# Patient Record
Sex: FEMALE | Race: WHITE | NOT HISPANIC OR LATINO | Employment: OTHER | URBAN - METROPOLITAN AREA
[De-identification: names, ages, dates, MRNs, and addresses within clinical notes are randomized per-mention and may not be internally consistent; named-entity substitution may affect disease eponyms.]

---

## 2022-11-28 ENCOUNTER — TRANSFERRED RECORDS (OUTPATIENT)
Dept: HEALTH INFORMATION MANAGEMENT | Facility: CLINIC | Age: 72
End: 2022-11-28

## 2023-08-13 ENCOUNTER — HEALTH MAINTENANCE LETTER (OUTPATIENT)
Age: 73
End: 2023-08-13

## 2023-09-12 ASSESSMENT — ENCOUNTER SYMPTOMS
ARTHRALGIAS: 0
FREQUENCY: 0
COUGH: 0
FEVER: 0
WEAKNESS: 0
DIZZINESS: 0
HEMATOCHEZIA: 0
JOINT SWELLING: 0
ABDOMINAL PAIN: 0
HEMATURIA: 0
CHILLS: 0
DIARRHEA: 0
SORE THROAT: 0
NERVOUS/ANXIOUS: 0
MYALGIAS: 0
BREAST MASS: 0
HEARTBURN: 0
CONSTIPATION: 0
HEADACHES: 0
SHORTNESS OF BREATH: 0
EYE PAIN: 0
DYSURIA: 0
NAUSEA: 0
PALPITATIONS: 0
PARESTHESIAS: 0

## 2023-09-12 ASSESSMENT — ACTIVITIES OF DAILY LIVING (ADL): CURRENT_FUNCTION: NO ASSISTANCE NEEDED

## 2023-09-19 ENCOUNTER — OFFICE VISIT (OUTPATIENT)
Dept: FAMILY MEDICINE | Facility: CLINIC | Age: 73
End: 2023-09-19
Payer: COMMERCIAL

## 2023-09-19 ENCOUNTER — PATIENT OUTREACH (OUTPATIENT)
Dept: ONCOLOGY | Facility: CLINIC | Age: 73
End: 2023-09-19

## 2023-09-19 VITALS
BODY MASS INDEX: 21.83 KG/M2 | RESPIRATION RATE: 13 BRPM | WEIGHT: 115.6 LBS | DIASTOLIC BLOOD PRESSURE: 73 MMHG | HEART RATE: 60 BPM | HEIGHT: 61 IN | OXYGEN SATURATION: 98 % | TEMPERATURE: 98.2 F | SYSTOLIC BLOOD PRESSURE: 120 MMHG

## 2023-09-19 DIAGNOSIS — E78.5 DYSLIPIDEMIA: ICD-10-CM

## 2023-09-19 DIAGNOSIS — M81.0 AGE RELATED OSTEOPOROSIS, UNSPECIFIED PATHOLOGICAL FRACTURE PRESENCE: ICD-10-CM

## 2023-09-19 DIAGNOSIS — Z86.59 HISTORY OF POSTTRAUMATIC STRESS DISORDER (PTSD): ICD-10-CM

## 2023-09-19 DIAGNOSIS — Z79.811 LONG TERM CURRENT USE OF AROMATASE INHIBITOR: ICD-10-CM

## 2023-09-19 DIAGNOSIS — Z01.00 ENCOUNTER FOR EXAMINATION OF VISION: ICD-10-CM

## 2023-09-19 DIAGNOSIS — C50.911 MALIGNANT NEOPLASM OF RIGHT FEMALE BREAST, UNSPECIFIED ESTROGEN RECEPTOR STATUS, UNSPECIFIED SITE OF BREAST (H): ICD-10-CM

## 2023-09-19 DIAGNOSIS — Z23 NEED FOR VACCINATION: ICD-10-CM

## 2023-09-19 DIAGNOSIS — M85.80 AGE-RELATED BONE LOSS: ICD-10-CM

## 2023-09-19 DIAGNOSIS — Z12.31 VISIT FOR SCREENING MAMMOGRAM: ICD-10-CM

## 2023-09-19 DIAGNOSIS — Z00.00 ENCOUNTER FOR MEDICARE ANNUAL WELLNESS EXAM: Primary | ICD-10-CM

## 2023-09-19 DIAGNOSIS — Z13.220 ENCOUNTER FOR SCREENING FOR LIPID DISORDER: ICD-10-CM

## 2023-09-19 PROCEDURE — G0439 PPPS, SUBSEQ VISIT: HCPCS | Performed by: INTERNAL MEDICINE

## 2023-09-19 PROCEDURE — 90662 IIV NO PRSV INCREASED AG IM: CPT | Performed by: INTERNAL MEDICINE

## 2023-09-19 PROCEDURE — G0008 ADMIN INFLUENZA VIRUS VAC: HCPCS | Performed by: INTERNAL MEDICINE

## 2023-09-19 RX ORDER — COVID-19 MOLECULAR TEST ASSAY
KIT MISCELLANEOUS
COMMUNITY
Start: 2022-10-19 | End: 2024-03-19

## 2023-09-19 RX ORDER — ALENDRONATE SODIUM 70 MG/1
70 TABLET ORAL
COMMUNITY
End: 2024-03-19

## 2023-09-19 RX ORDER — ANASTROZOLE 1 MG/1
1 TABLET ORAL DAILY
COMMUNITY
End: 2024-03-19

## 2023-09-19 ASSESSMENT — ENCOUNTER SYMPTOMS
SHORTNESS OF BREATH: 0
JOINT SWELLING: 0
MYALGIAS: 0
HEMATOCHEZIA: 0
SORE THROAT: 0
DYSURIA: 0
WEAKNESS: 0
PALPITATIONS: 0
COUGH: 0
PARESTHESIAS: 0
BREAST MASS: 0
HEARTBURN: 0
NAUSEA: 0
DIZZINESS: 0
CHILLS: 0
ARTHRALGIAS: 0
FREQUENCY: 0
HEMATURIA: 0
DIARRHEA: 0
CONSTIPATION: 0
FEVER: 0
ABDOMINAL PAIN: 0
EYE PAIN: 0
HEADACHES: 0
NERVOUS/ANXIOUS: 0

## 2023-09-19 ASSESSMENT — ACTIVITIES OF DAILY LIVING (ADL): CURRENT_FUNCTION: NO ASSISTANCE NEEDED

## 2023-09-19 ASSESSMENT — PAIN SCALES - GENERAL: PAINLEVEL: NO PAIN (0)

## 2023-09-19 NOTE — PROGRESS NOTES
New Patient Oncology Nurse Navigator Note     Referring provider: Andressa Khanna MD     [facility]IM[facility]PEDS     Referred to (specialty:) Medical Oncology     Requested provider (if applicable): NA     Date Referral Received: September 19, 2023     Evaluation for:  Breast cancer  C50.911 (ICD-10-CM) - Malignant neoplasm of right female breast, unspecified estrogen receptor status, unspecified site of breast (H)      Clinical History (per Nurse review of records provided):      Per patient she was previously receiving care at [facility] in [place] with Dr. Agnes Dash. She had a lumpectomy December of 2020 followed by 6 weeks of radiation starting inf January 2021. She started endocrine therapy at the completion of radiation with anastrozole. He last mammogram was October of last year. She was seeing her Oncologist every 6 months. She has plenty of anastrozole and will not need a refill for a couple of months.     Lumpectomy 2020  Radiation Tx January 2021 for about 6 wks.   Started Endocrine Therapy with   Last Mammogram was Oct. Of 2022  Was seeing Oncologist every 6 months.    Records Location: NA     Records Needed: NEED records from [place]. Chart flagged as records needed.      Additional testing needed prior to consult: NA    Payor: [facility] / Plan: [facility] MEDICARE ADVANTAGE / Product Type: Medicare /     September 19, 2023  Called patient this afternoon to introduce self and the role of the nurse navigator. Explained to patient that we have received the referral for her to get established with medical oncology but are awaiting her medical records. Provided patient with contact information and informed her that one records have been received and reviewed she would be called to set up her consultation with medical oncology. Patient verbalized understanding of plan and was grateful for the coordination of care.     Amarilis CONNORSN, RN   Oncology  Nurse Navigator   [facility]   [phone] / [phone]

## 2023-09-19 NOTE — PROGRESS NOTES
"SUBJECTIVE:   Dakota is a 73 year old who presents for Preventive Visit.      9/19/2023     9:50 AM   Additional Questions   Roomed by manuelito       Are you in the first 12 months of your Medicare coverage?  No    Healthy Habits:     In general, how would you rate your overall health?  Excellent    Frequency of exercise:  4-5 days/week    Duration of exercise:  45-60 minutes    Do you usually eat at least 4 servings of fruit and vegetables a day, include whole grains    & fiber and avoid regularly eating high fat or \"junk\" foods?  Yes    Taking medications regularly:  Yes    Ability to successfully perform activities of daily living:  No assistance needed    Home Safety:  Lack of grab bars in the bathroom    Hearing Impairment:  No hearing concerns    In the past 6 months, have you been bothered by leaking of urine?  No    In general, how would you rate your overall mental or emotional health?  Excellent    Additional concerns today:  No        Have you ever done Advance Care Planning? (For example, a Health Directive, POLST, or a discussion with a medical provider or your loved ones about your wishes): Yes, patient states has an Advance Care Planning document and will bring a copy to the clinic.    Hearing Acuity: Able to converse without any difficulty    Fall risk  Fallen 2 or more times in the past year?: No  Any fall with injury in the past year?: No  click delete button to remove this line now  Cognitive Screening   1) Repeat 3 items (Leader, Season, Table)    2) Clock draw: {NORMAL  3) 3 item recall: Recalls 3 objects  Results: 3 items recalled: COGNITIVE IMPAIRMENT LESS LIKELY    Mini-CogTM Copyright FARIDEH Mitchell. Licensed by the author for use in [facility]; reprinted with permission (lamine@.[facility]). All rights reserved.      Do you have sleep apnea, excessive snoring or daytime drowsiness? : no    Reviewed and updated as needed this visit by clinical staff   Tobacco  Allergies  Meds  Problems  Med " Hx  Surg Hx  Fam Hx          Reviewed and updated as needed this visit by Provider   Tobacco  Allergies  Meds  Problems  Med Hx  Surg Hx  Fam Hx         Social History     Tobacco Use    Smoking status: Never    Smokeless tobacco: Never   Substance Use Topics    Alcohol use: Not on file             9/12/2023    10:39 AM   Alcohol Use   Prescreen: >3 drinks/day or >7 drinks/week? No          No data to display              Do you have a current opioid prescription? No  Do you use any other controlled substances or medications that are not prescribed by a provider? None    Current providers sharing in care for this patient include:   Patient Care Team:  Clinic, [facility] as PCP - General    The following health maintenance items are reviewed in Epic and correct as of today:  Health Maintenance   Topic Date Due    DEXA  Never done    MAMMO SCREENING  Never done    COVID-19 Vaccine (1) Never done    COLORECTAL CANCER SCREENING  Never done    HEPATITIS C SCREENING  Never done    DTAP/TDAP/TD IMMUNIZATION (1 - Tdap) Never done    LIPID  Never done    ZOSTER IMMUNIZATION (1 of 2) Never done    MEDICARE ANNUAL WELLNESS VISIT  Never done    Pneumococcal Vaccine: 65+ Years (1 - PCV) Never done    INFLUENZA VACCINE (1) Never done    ANNUAL REVIEW OF HM ORDERS  09/19/2024    FALL RISK ASSESSMENT  09/19/2024    ADVANCE CARE PLANNING  09/19/2028    PHQ-2 (once per calendar year)  Completed    IPV IMMUNIZATION  Aged Out    HPV IMMUNIZATION  Aged Out    MENINGITIS IMMUNIZATION  Aged Out     Lab work is in process  Labs reviewed in [facility]  Mammogram Screening: Mammogram Screening: Recommended mammography every 1-2 years with patient discussion and risk factor consideration - Annual given Breast cancer history      Review of Systems   Constitutional:  Negative for chills and fever.   HENT:  Negative for congestion, ear pain, hearing loss and sore throat.    Eyes:  Negative for pain and visual disturbance.  "  Respiratory:  Negative for cough and shortness of breath.    Cardiovascular:  Negative for chest pain, palpitations and peripheral edema.   Gastrointestinal:  Negative for abdominal pain, constipation, diarrhea, heartburn, hematochezia and nausea.   Breasts:  Negative for tenderness, breast mass and discharge.   Genitourinary:  Negative for dysuria, frequency, genital sores, hematuria, pelvic pain, urgency, vaginal bleeding and vaginal discharge.   Musculoskeletal:  Negative for arthralgias, joint swelling and myalgias.   Skin:  Negative for rash.   Neurological:  Negative for dizziness, weakness, headaches and paresthesias.   Psychiatric/Behavioral:  Negative for mood changes. The patient is not nervous/anxious.      Constitutional, HEENT, cardiovascular, pulmonary, GI, , musculoskeletal, neuro, skin, endocrine and psych systems are negative, except as otherwise noted.    OBJECTIVE:   /73   Pulse 60   Temp 98.2  F (36.8  C) (Temporal)   Resp 13   Ht 1.537 m (5' 0.5\")   Wt 52.4 kg (115 lb 9.6 oz)   SpO2 98%   BMI 22.20 kg/m   Estimated body mass index is 22.2 kg/m  as calculated from the following:    Height as of this encounter: 1.537 m (5' 0.5\").    Weight as of this encounter: 52.4 kg (115 lb 9.6 oz).  Physical Exam  GENERAL APPEARANCE: healthy, alert and no distress  EYES: Eyes grossly normal to inspection, PERRL and conjunctivae and sclerae normal  HENT: ear canals and TM's normal, nose and mouth without ulcers or lesions, oropharynx clear and oral mucous membranes moist  NECK: no adenopathy, no asymmetry, masses, or scars and thyroid normal to palpation  RESP: lungs clear to auscultation - no rales, rhonchi or wheezes  BREAST: Deferred , Mammogram  CV: regular rate and rhythm, normal S1 S2, no S3 or S4, no murmur, click or rub, no peripheral edema and peripheral pulses strong  ABDOMEN: soft, nontender, no hepatosplenomegaly, no masses and bowel sounds normal  MS: no musculoskeletal defects are " noted and gait is age appropriate without ataxia  SKIN: no suspicious lesions or rashes  NEURO: Normal strength and tone, sensory exam grossly normal, mentation intact and speech normal  PSYCH: mentation appears normal and affect normal/bright    Diagnostic Test Results:  Labs reviewed in Epic    ASSESSMENT / PLAN:       Assessment and Plan  1. Encounter for Medicare annual wellness exam  New to [place], no records in [facility] or Care Everywhere.  She is here for [facility] care. Pt has recently relocated from [place], GIANCARLO signed await for the records. She is currently with her daughter at [place].   - As we await for records, shared decision to proceed with this AWV and baseline labs.   - REVIEW OF HEALTH MAINTENANCE PROTOCOL ORDERS  - MA SCREENING DIGITAL BILAT - Future  (s+30); Future  - Lipid panel reflex to direct LDL Non-fasting; Future  - INFLUENZA VACCINE 65+ (FLUZONE HD)  - Adult Oncology/Hematology  Referral; Future  - Comprehensive metabolic panel (BMP + Alb, Alk Phos, ALT, AST, Total. Bili, TP); Future  - CBC with platelets; Future  - Adult Eye  Referral; Future  - Vitamin D deficiency screening; Future    2. Malignant neoplasm of right female breast, unspecified estrogen receptor status, unspecified site of breast (H)  Chronic problem, stable. Pt has been on Fosamax since 3 yrs for Osteoporosis and follows Oncology for breast cancer treatment with Anastrazole.   - Adult Oncology/Hematology  Referral; Future    3. Visit for screening mammogram  - MA SCREENING DIGITAL BILAT - Future  (s+30); Future    4. History of posttraumatic stress disorder (PTSD)  Not on any treatments at this time. Pt states that she has lost her  and sons which took some time to come over it. Not opting for any treatments offered at this time.     5. Age-related bone loss  - Vitamin D deficiency screening; Future    6. Age related osteoporosis, unspecified pathological fracture presence  - Vitamin D  deficiency screening; Future    7. Dyslipidemia  - Lipid panel reflex to direct LDL Non-fasting; Future    8. Encounter for screening for lipid disorder  - Lipid panel reflex to direct LDL Non-fasting; Future    9. Encounter for examination of vision  Pt requesting for a regular Ophthalmology referral . Will do as requested.   - Adult Eye  Referral; Future    10. Need for vaccination  - INFLUENZA VACCINE 65+ (FLUZONE HD)         Please note that this note consists of symbols derived from keyboarding, dictation and/or voice recognition software. As a result, there may be errors in the script that have gone undetected. Please consider this when interpreting information found in this chart.    Patient Instructions   As discussed , please do fasting labs placed.     Placed referral to Hem oncology for managing Anastrazole.     ==============================================    Patient Education  Personalized Prevention Plan  You are due for the preventive services outlined below.  Your care team is available to assist you in scheduling these services.  If you have already completed any of these items, please share that information with your care team to update in your medical record.  Health Maintenance Due   Topic Date Due    Osteoporosis Screening  Never done    ANNUAL REVIEW OF HM ORDERS  Never done    Mammogram  Never done    COVID-19 Vaccine (1) Never done    Colorectal Cancer Screening  Never done    Hepatitis C Screening  Never done    Diptheria Tetanus Pertussis (DTAP/TDAP/TD) Vaccine (1 - Tdap) Never done    Cholesterol Lab  Never done    Zoster (Shingles) Vaccine (1 of 2) Never done    Annual Wellness Visit  Never done    Pneumococcal Vaccine (1 - PCV) Never done    Flu Vaccine (1) Never done        Return in about 6 months (around 3/19/2024), or if symptoms worsen or fail to improve, for video visit, Follow up of last visit, If symptoms persist.    Andressa Khanna MD  [facility]  SHAHANA        Patient has been advised of split billing requirements and indicates understanding: Yes      COUNSELING:  Reviewed preventive health counseling, as reflected in patient instructions  Special attention given to:       Regular exercise       Healthy diet/nutrition       Vision screening       Hearing screening       Dental care       Bladder control       Fall risk prevention       Immunizations  Vaccinated for: Influenza           Osteoporosis prevention/bone health        She reports that she has never smoked. She has never used smokeless tobacco.      Appropriate preventive services were discussed with this patient, including applicable screening as appropriate for cardiovascular disease, diabetes, osteopenia/osteoporosis, and glaucoma.  As appropriate for age/gender, discussed screening for colorectal cancer, prostate cancer, breast cancer, and cervical cancer. Checklist reviewing preventive services available has been given to the patient.    Reviewed patients plan of care and provided an AVS. The Basic Care Plan (routine screening as documented in Health Maintenance) for Dakota meets the Care Plan requirement. This Care Plan has been established and reviewed with the Patient.          Andressa Khanna MD  [facility] JENNIFER SHAHANA    Identified Health Risks:  I have reviewed Opioid Use Disorder and Substance Use Disorder risk factors and made any needed referrals.

## 2023-09-19 NOTE — PATIENT INSTRUCTIONS
As discussed , please do fasting labs placed.     Placed referral to Hem oncology for managing Anastrazole.     ==============================================    Patient Education   Personalized Prevention Plan  You are due for the preventive services outlined below.  Your care team is available to assist you in scheduling these services.  If you have already completed any of these items, please share that information with your care team to update in your medical record.  Health Maintenance Due   Topic Date Due    Osteoporosis Screening  Never done    ANNUAL REVIEW OF HM ORDERS  Never done    Mammogram  Never done    COVID-19 Vaccine (1) Never done    Colorectal Cancer Screening  Never done    Hepatitis C Screening  Never done    Diptheria Tetanus Pertussis (DTAP/TDAP/TD) Vaccine (1 - Tdap) Never done    Cholesterol Lab  Never done    Zoster (Shingles) Vaccine (1 of 2) Never done    Annual Wellness Visit  Never done    Pneumococcal Vaccine (1 - PCV) Never done    Flu Vaccine (1) Never done

## 2023-09-20 ENCOUNTER — PRE VISIT (OUTPATIENT)
Dept: OTHER | Age: 73
End: 2023-09-20

## 2023-09-20 NOTE — TELEPHONE ENCOUNTER
RECORDS STATUS - BREAST    Patient was previously getting care a [facility] in [place] with Dr. Agnes Dash.     Need records for breast cancer:  Lumpectomy 2020  Radiation Tx January 2021 for about 6 wks.   Last Mammogram was Oct. Of 2022    Action September 20, 2023 9:03 AM    Action Taken - [facility] is apart of , records are obtained.   - Request is faxed to [facility] for an image disc.   [facility] [employer] Tracking #: [id number]    October 11, 2023 12:45 PM:  Slides requested from [facility] in [place].   [place] [employer] Tracking #: [id number]     Imaging DISC Received  September 22, 2023 1:42 PM    Action: Imaging disc from [facility] received and taken to [facility] at [facility] for upload.        RECORDS REQUESTED FROM: [facility]   DATE REQUESTED:    NOTES DETAILS STATUS   OFFICE NOTE from referring provider Epic 9/20/23: Dr. Andressa Khanna   OFFICE NOTE from medical oncologist [facility] 12/6/22: Dr. Agnes Dash   OFFICE NOTE from surgeon [facility] 12/14/22: Dr. Rakel Hammond   OFFICE NOTE from radiation oncologist [facility] 5/13/21: Dr. Oneal Garcia   OPERATIVE REPORT [facility] 12/4/20: Lumpectomy  10/26/20: US GUIDED RIGHT BREAST BIOPSY     MEDICATION LIST [facility] [employer] [facility] Tracking #: [id number]   PATHOLOGY REPORTS  (Tissue diagnosis, Stage, ER/ND percentage positive and intensity of staining, HER2 IHC, FISH, and all biopsies from breast and any distant metastasis)                 Report in [facility] 12/4/20: [id number] (positive)  10/26/20: [id number] (positive)   IMAGING (NEED IMAGES & REPORT)  [facility] [employer] Tracking #:   [id number]   MAMMO (Img req from [facility]) 11/28/22, 10/19/21, 11/2/20, 10/26/20, 10/14/20, 9/18/19, 9/5/19, 8/31/18   ULTRASOUND (Img req from [facility]) 11/2/20, 10/19/20: US Breast  10/26/20: US Breast Bx   BONE SCAN (Img req from [facility]) 10/19/21, 9/18/19

## 2023-10-03 ENCOUNTER — ANCILLARY PROCEDURE (OUTPATIENT)
Dept: GENERAL RADIOLOGY | Facility: CLINIC | Age: 73
End: 2023-10-03
Attending: PHYSICIAN ASSISTANT
Payer: COMMERCIAL

## 2023-10-03 ENCOUNTER — TELEPHONE (OUTPATIENT)
Dept: FAMILY MEDICINE | Facility: CLINIC | Age: 73
End: 2023-10-03

## 2023-10-03 ENCOUNTER — OFFICE VISIT (OUTPATIENT)
Dept: FAMILY MEDICINE | Facility: CLINIC | Age: 73
End: 2023-10-03
Payer: COMMERCIAL

## 2023-10-03 VITALS
HEART RATE: 58 BPM | TEMPERATURE: 97.9 F | SYSTOLIC BLOOD PRESSURE: 120 MMHG | HEIGHT: 61 IN | BODY MASS INDEX: 21.9 KG/M2 | WEIGHT: 116 LBS | OXYGEN SATURATION: 97 % | RESPIRATION RATE: 14 BRPM | DIASTOLIC BLOOD PRESSURE: 72 MMHG

## 2023-10-03 DIAGNOSIS — S69.91XA WRIST INJURY, RIGHT, INITIAL ENCOUNTER: Primary | ICD-10-CM

## 2023-10-03 DIAGNOSIS — S52.501A CLOSED FRACTURE OF DISTAL END OF RIGHT RADIUS, UNSPECIFIED FRACTURE MORPHOLOGY, INITIAL ENCOUNTER: Primary | ICD-10-CM

## 2023-10-03 PROCEDURE — 91320 SARSCV2 VAC 30MCG TRS-SUC IM: CPT | Performed by: PHYSICIAN ASSISTANT

## 2023-10-03 PROCEDURE — 90480 ADMN SARSCOV2 VAC 1/ONLY CMP: CPT | Performed by: PHYSICIAN ASSISTANT

## 2023-10-03 PROCEDURE — 73110 X-RAY EXAM OF WRIST: CPT | Mod: TC | Performed by: RADIOLOGY

## 2023-10-03 PROCEDURE — 99213 OFFICE O/P EST LOW 20 MIN: CPT | Performed by: PHYSICIAN ASSISTANT

## 2023-10-03 ASSESSMENT — PAIN SCALES - GENERAL: PAINLEVEL: MILD PAIN (2)

## 2023-10-03 NOTE — TELEPHONE ENCOUNTER
Called and spoke with pt, relayed message below. Pt verbalized understanding and in agreement with plan.    Yunior Oh, Ana Marrero, ARMIDA  P Cs Triage Im  Team - please CALL patient with results.  XR shows fracture of wrist  Ortho referral placed, please give her info  Continue to brace and ice  Take tylenol as needed    Thanks    CLARISSA GARY, RN on 10/3/2023 at 3:34 PM

## 2023-10-03 NOTE — RESULT ENCOUNTER NOTE
Team - please CALL patient with results.  XR shows fracture of wrist  Ortho referral placed, please give her info  Continue to brace and ice  Take tylenol as needed    thanks

## 2023-10-03 NOTE — PATIENT INSTRUCTIONS
Continue icing and bracing     Take tylenol as needed for pain up to 1000mg three times daily, do not exceed 3000mg in 24 hour period    X-rays today

## 2023-10-03 NOTE — PROGRESS NOTES
"Assessment and Plan:     (S69.91XA) Wrist injury, right, initial encounter  (primary encounter diagnosis)  Comment: missed a step and fell on flexed wrist, tender over distal radius, no snuff box tendenerss  Plan: XR Wrist Right G/E 3 Views  Continue brace  Ice/tylenol  Await XR results     Covid booster today    Ana Oh PA-C    Subjective   Dakota is a 73 year old, presenting for the following health issues:  Hand Problem    History of Present Illness       Reason for visit:  I fell and injured my hand/wrist.  Symptom onset:  1-3 days ago  Symptoms include:  Swollen wrist, soreness and lack of flexibility.  Symptom intensity:  Moderate  Symptom progression:  Improving  Had these symptoms before:  No  What makes it worse:  Movements with my hand  What makes it better:  Icing and ibuprofen    She eats 4 or more servings of fruits and vegetables daily.She consumes 0 sweetened beverage(s) daily.She exercises with enough effort to increase her heart rate 30 to 60 minutes per day.  She exercises with enough effort to increase her heart rate 6 days per week.   She is taking medications regularly.     Dakota is here for a right wrist injury  She got up in the night 4 days ago and missed a step and lost her balance and fell on right wrist in flexed position  It has been really sore and swollen  She notes she did not hear a bone break  She has been icing w/some improvement   She denies any other injuries with the fall    Review of Systems   See above      Objective      /72 (BP Location: Left arm, Patient Position: Sitting, Cuff Size: Adult Regular)   Pulse 58   Temp 97.9  F (36.6  C) (Temporal)   Resp 14   Ht 1.537 m (5' 0.5\")   Wt 52.6 kg (116 lb)   SpO2 97%   BMI 22.28 kg/m        Physical Exam     GENERAL: healthy, alert and no distress  RESP: lungs clear to auscultation - no rales, no rhonchi, no wheezes  CV: regular rates and rhythm, normal S1 S2, no S3 or S4 and no murmur, no click or rub   MS: " extremities- right wrist with extensive bruising on ventral aspect, tender/swelling over radius, no snuff box tenderness, CR< 2 seconds, full ROM

## 2023-10-04 NOTE — PROGRESS NOTES
ASSESSMENT & PLAN    Dakota was seen today for pain.    Diagnoses and all orders for this visit:    Other closed intra-articular fracture of distal end of right radius, initial encounter  -     Orthopedic  Referral    Right wrist fracture  -     Cancel: XR Wrist Right G/E 3 Views; Future    Other orders  -     Cast/splint application        73-year-old female presents with worsening right wrist pain after falling onto an outstretched arm and landing on her posterior wrist approximately 1 week ago. Imaging reveals a nondisplaced intra-articular distal radius fracture.  She does have a history of osteoporosis. On physical exam, she does have tenderness to palpation over her distal radius at the area of fracture, as well as mild ecchymosis and swelling, but otherwise intact neurovascular exam.    Plan:  - Applied short arm cast in clinic today, discontinue wrist splint  - Tylenol as needed for pain  - Plan to follow-up in 3 weeks with repeat imaging  - Discussed the diagnosis. Based on the xrays, the fracture is non-displaced and in acceptable alignment. As long as fracture maintains alignment, nonoperative treatment can be pursued. Recommend monitoring every 3 weeks to start, because if the fracture displaces and becomes unstable, I would recommend surgical referral. Anticipate 6-8 weeks of immobilization total, and discussed that given her history of osteoporosis, it may take slightly longer for her wrist to heal.  Discussed future risks of wrist stiffness and arthritis given current injury.  Will immobilize in short arm cast with follow up in 3 weeks. Discussed general cast care and concerning signs and symptoms - call for sooner follow up if problems while immobilized.     Return in about 3 weeks (around 10/26/2023).    Cast/splint application    Date/Time: 10/5/2023 4:38 PM    Performed by: Elli Harper ATC  Authorized by: Hawa Shelton DO    Consent:     Consent obtained:  Verbal    Consent  given by:  Patient    Risks discussed:  Swelling  Pre-procedure details:     Sensation:  Normal  Procedure details:     Laterality:  Right    Location:  Wrist    Wrist:  R wrist    Strapping: no      Cast type:  Short arm    Supplies:  Fiberglass  Post-procedure details:     Pain:  Improved    Pain level:  0/10    Sensation:  Normal    Skin color:  Eccymotic    Patient tolerance of procedure:  Tolerated well, no immediate complications    Patient provided with cast or splint care instructions: Yes          Dr. Hawa Shelton, DO  [facility]  Sports Medicine     -----  Chief Complaint   Patient presents with    Right Wrist - Pain       SUBJECTIVE  Dakota Camejo is a/an 73 year old female who is seen as a self referral for evaluation of right wrist pain and subsequent radial fracture after a fall on Friday night. She was adjusting the thermostat and she tripped and fell on a set of small steps. Put her forearm down to brace her fall, fell on back of hand.     The patient is seen alone    Onset: 6 day(s) ago. Patient describes injury as a fall in the night on back of hand  Location/characterization of Pain: right wrist  Current method of immobilization?: brace, bought it at [facility]. Tylenol 3x/day 1000mg, ice  Associated symptoms: Swelling, achy, moderate bruising.     Prior injury/Surgical history of affected joint: NO  Social History/Occupation: retired     REVIEW OF SYSTEMS:  Pertinent positives/negative: As stated above in HPI    OBJECTIVE:  /74   Pulse 72   Ht 1.524 m (5')   Wt 52.6 kg (116 lb)   BMI 22.65 kg/m     General: Alert and in no distress  Skin: no visable rashes  CV: Extremities appear well perfused   Resp: normal respiratory effort, no conversational dyspnea   Psych: normal mood, affect  MSK:  Right wrist exam  Swelling and ecchymosis noted to volar wrist.  Sensation grossly intact to light touch, intact radial pulse, capillary refill less than 2 seconds.   Very mild snuffbox tenderness, but primarily tender to palpation over the distal radius.  Intact wrist flexion and extension, intact  strength.  No radial head or elbow tenderness to palpation.    RADIOLOGY:  Final results and radiologist's interpretation available in the [facility] health record.  Images were personally reviewed and discussed with the patient in the office today.  My personal interpretation of the performed imaging: X-ray of the right wrist performed on 10/3/2023 reveals an acute nondisplaced intra-articular distal radius fracture with no displacement.  She also has significant degenerative changes in her first CMC joint.      Review of prior external note(s) from - primary care

## 2023-10-05 ENCOUNTER — OFFICE VISIT (OUTPATIENT)
Dept: ORTHOPEDICS | Facility: CLINIC | Age: 73
End: 2023-10-05
Payer: COMMERCIAL

## 2023-10-05 VITALS
WEIGHT: 116 LBS | DIASTOLIC BLOOD PRESSURE: 74 MMHG | HEIGHT: 60 IN | BODY MASS INDEX: 22.78 KG/M2 | SYSTOLIC BLOOD PRESSURE: 116 MMHG | HEART RATE: 72 BPM

## 2023-10-05 DIAGNOSIS — S52.571A OTHER CLOSED INTRA-ARTICULAR FRACTURE OF DISTAL END OF RIGHT RADIUS, INITIAL ENCOUNTER: Primary | ICD-10-CM

## 2023-10-05 DIAGNOSIS — S62.101A CLOSED FRACTURE OF RIGHT WRIST, INITIAL ENCOUNTER: ICD-10-CM

## 2023-10-05 PROCEDURE — 29075 APPL CST ELBW FNGR SHORT ARM: CPT | Mod: RT

## 2023-10-05 PROCEDURE — 99204 OFFICE O/P NEW MOD 45 MIN: CPT | Mod: 25 | Performed by: STUDENT IN AN ORGANIZED HEALTH CARE EDUCATION/TRAINING PROGRAM

## 2023-10-05 NOTE — LETTER
10/5/2023         RE: Dakota Camejo  [address]  [place] [address]        Dear Colleague,    Thank you for referring your patient, Dakota Camejo, to the [facility] [place]. Please see a copy of my visit note below.    ASSESSMENT & PLAN    Dakota was seen today for pain.    Diagnoses and all orders for this visit:    Other closed intra-articular fracture of distal end of right radius, initial encounter  -     Orthopedic  Referral    Right wrist fracture  -     Cancel: XR Wrist Right G/E 3 Views; Future    Other orders  -     Cast/splint application        73-year-old female presents with worsening right wrist pain after falling onto an outstretched arm and landing on her posterior wrist approximately 1 week ago. Imaging reveals a nondisplaced intra-articular distal radius fracture.  She does have a history of osteoporosis. On physical exam, she does have tenderness to palpation over her distal radius at the area of fracture, as well as mild ecchymosis and swelling, but otherwise intact neurovascular exam.    Plan:  - Applied short arm cast in clinic today, discontinue wrist splint  - Tylenol as needed for pain  - Plan to follow-up in 3 weeks with repeat imaging  - Discussed the diagnosis. Based on the xrays, the fracture is minimally/mildly displaced and in acceptable alignment. As long as fracture maintains alignment, nonoperative treatment can be pursued. Recommend monitoring every 3 weeks to start, because if the fracture displaces and becomes unstable, I would recommend surgical referral. Anticipate 6-8 weeks of immobilization total, and discussed that given her history of osteoporosis, it may take slightly longer for her wrist to heal.  Discussed future risks of wrist stiffness and arthritis given current injury.  Will immobilize in short arm cast with follow up in 3 weeks. Discussed general cast care and concerning signs and symptoms - call for sooner  follow up if problems while immobilized.     Return in about 3 weeks (around 10/26/2023).    Cast/splint application    Date/Time: 10/5/2023 4:38 PM    Performed by: Elli Harper ATC  Authorized by: Hawa Shelton DO    Consent:     Consent obtained:  Verbal    Consent given by:  Patient    Risks discussed:  Swelling  Pre-procedure details:     Sensation:  Normal  Procedure details:     Laterality:  Right    Location:  Wrist    Wrist:  R wrist    Strapping: no      Cast type:  Short arm    Supplies:  Fiberglass  Post-procedure details:     Pain:  Improved    Pain level:  0/10    Sensation:  Normal    Skin color:  Eccymotic    Patient tolerance of procedure:  Tolerated well, no immediate complications    Patient provided with cast or splint care instructions: Yes          Dr. Hawa Shelton DO  [facility]  Sports Medicine     -----  Chief Complaint   Patient presents with     Right Wrist - Pain       SUBJECTIVE  Dakota Camejo is a/an 73 year old female who is seen as a self referral for evaluation of right wrist pain and subsequent radial fracture after a fall on Friday night. She was adjusting the thermostat and she tripped and fell on a set of small steps. Put her forearm down to brace her fall, fell on back of hand.     The patient is seen alone    Onset: 6 day(s) ago. Patient describes injury as a fall in the night on back of hand  Location/characterization of Pain: right wrist  Current method of immobilization?: brace, bought it at [facility]. Tylenol 3x/day 1000mg, ice  Associated symptoms: Swelling, achy, moderate bruising.     Prior injury/Surgical history of affected joint: NO  Social History/Occupation: retired     REVIEW OF SYSTEMS:  Pertinent positives/negative: As stated above in HPI    OBJECTIVE:  /74   Pulse 72   Ht 1.524 m (5')   Wt 52.6 kg (116 lb)   BMI 22.65 kg/m     General: Alert and in no distress  Skin: no visable rashes  CV: Extremities  appear well perfused   Resp: normal respiratory effort, no conversational dyspnea   Psych: normal mood, affect  MSK:  Right wrist exam  Swelling and ecchymosis noted to volar wrist.  Sensation grossly intact to light touch, intact radial pulse, capillary refill less than 2 seconds.  Very mild snuffbox tenderness, but primarily tender to palpation over the distal radius.  Intact wrist flexion and extension, intact  strength.  No radial head or elbow tenderness to palpation.    RADIOLOGY:  Final results and radiologist's interpretation available in the [facility] health record.  Images were personally reviewed and discussed with the patient in the office today.  My personal interpretation of the performed imaging: X-ray of the right wrist performed on 10/3/2023 reveals an acute nondisplaced intra-articular distal radius fracture with no displacement.  She also has significant degenerative changes in her first CMC joint.      Review of prior external note(s) from - primary care             Again, thank you for allowing me to participate in the care of your patient.        Sincerely,        Hawa Shelton, DO

## 2023-10-05 NOTE — PATIENT INSTRUCTIONS
Caring for Your Cast     A cast is used to protect an injured body part and allow it to heal by limiting the amount of motion occurring around the injury. Pain and swelling of the injured area is normal for 48 hours after your cast is put on. If you have swelling, wiggle your toes or fingers to ease it. Doing so encourages blood flow to your arm or leg.     It is important that you keep your cast dry, unless your doctor tells you differently. If the padding of the cast gets wet, your skin may be damaged and become infected. When showering or taking a bath, put the cast in a heavy plastic bag that can be held in place with a rubber band. If your cast gets wet and does not dry out in four to five hours, call your doctor s office.   To keep the cast clean, use wash clothes or baby wipes around it.   You may experience some itching inside the cast. This is normal. Avoid putting anything in the cast, even your finger, as you can injure your skin and cause infection. Try shaking some talcum powder or blowing cool air from a hair dryer into the cast to ease itching.   If these signs or symptoms develop, call your doctor immediately.      Pain gets worse    Swelling that cuts off blood flow that does not go away, even when you lift the body part above the level of your heart    Fever after itching. It may be related to an infection.    Fluid draining from your skin under the cast     Your cast may become loose as swelling goes down. If the cast feels too loose or if it is so loose you can take it off, call your doctor s office.     Your doctor or  will give you recommendations for activity based on your injury. Some sports allow casts if properly padded by a doctor or .     For complete healing, your cast should only be removed at the direction of your doctor or clinic staff. A special saw ensures its safe removal and protects the skin and other tissue under the cast.     Today in clinic,  we discussed the diagnosis. Based on the xrays, the fracture is minimally/mildly displaced and in acceptable alignment. As long as fracture maintains alignment, nonoperative treatment can be pursued. Recommend monitoring every 3 weeks to start, because if the fracture displaces and becomes unstable, I would recommend surgical referral. Anticipate 6-8 weeks of immobilization total, and discussed that given your history of osteoporosis, it may take slightly longer for your wrist to heal.  Discussed future risks of wrist stiffness and arthritis given current injury.  Will immobilize in short arm cast with follow up in 3 weeks. Discussed general cast care and concerning signs and symptoms - call for sooner follow up if problems while immobilized.

## 2023-10-06 ENCOUNTER — LAB (OUTPATIENT)
Dept: LAB | Facility: CLINIC | Age: 73
End: 2023-10-06
Payer: COMMERCIAL

## 2023-10-06 DIAGNOSIS — Z11.59 NEED FOR HEPATITIS C SCREENING TEST: Primary | ICD-10-CM

## 2023-10-06 DIAGNOSIS — Z13.220 ENCOUNTER FOR SCREENING FOR LIPID DISORDER: ICD-10-CM

## 2023-10-06 DIAGNOSIS — M81.0 AGE RELATED OSTEOPOROSIS, UNSPECIFIED PATHOLOGICAL FRACTURE PRESENCE: ICD-10-CM

## 2023-10-06 DIAGNOSIS — M85.80 AGE-RELATED BONE LOSS: ICD-10-CM

## 2023-10-06 DIAGNOSIS — E78.5 DYSLIPIDEMIA: ICD-10-CM

## 2023-10-06 DIAGNOSIS — Z00.00 ENCOUNTER FOR MEDICARE ANNUAL WELLNESS EXAM: ICD-10-CM

## 2023-10-06 LAB
ALBUMIN SERPL BCG-MCNC: 4.3 G/DL (ref 3.5–5.2)
ALP SERPL-CCNC: 80 U/L (ref 35–104)
ALT SERPL W P-5'-P-CCNC: 10 U/L (ref 0–50)
ANION GAP SERPL CALCULATED.3IONS-SCNC: 12 MMOL/L (ref 7–15)
AST SERPL W P-5'-P-CCNC: 19 U/L (ref 0–45)
BILIRUB SERPL-MCNC: 0.3 MG/DL
BUN SERPL-MCNC: 12.9 MG/DL (ref 8–23)
CALCIUM SERPL-MCNC: 9.4 MG/DL (ref 8.8–10.2)
CHLORIDE SERPL-SCNC: 104 MMOL/L (ref 98–107)
CHOLEST SERPL-MCNC: 215 MG/DL
CREAT SERPL-MCNC: 0.61 MG/DL (ref 0.51–0.95)
DEPRECATED HCO3 PLAS-SCNC: 22 MMOL/L (ref 22–29)
EGFRCR SERPLBLD CKD-EPI 2021: >90 ML/MIN/1.73M2
ERYTHROCYTE [DISTWIDTH] IN BLOOD BY AUTOMATED COUNT: 12.8 % (ref 10–15)
GLUCOSE SERPL-MCNC: 104 MG/DL (ref 70–99)
HCT VFR BLD AUTO: 40.5 % (ref 35–47)
HDLC SERPL-MCNC: 70 MG/DL
HGB BLD-MCNC: 13.2 G/DL (ref 11.7–15.7)
LDLC SERPL CALC-MCNC: 129 MG/DL
MCH RBC QN AUTO: 30.2 PG (ref 26.5–33)
MCHC RBC AUTO-ENTMCNC: 32.6 G/DL (ref 31.5–36.5)
MCV RBC AUTO: 93 FL (ref 78–100)
NONHDLC SERPL-MCNC: 145 MG/DL
PLATELET # BLD AUTO: 311 10E3/UL (ref 150–450)
POTASSIUM SERPL-SCNC: 4.2 MMOL/L (ref 3.4–5.3)
PROT SERPL-MCNC: 7.1 G/DL (ref 6.4–8.3)
RBC # BLD AUTO: 4.37 10E6/UL (ref 3.8–5.2)
SODIUM SERPL-SCNC: 138 MMOL/L (ref 135–145)
TRIGL SERPL-MCNC: 79 MG/DL
VIT D+METAB SERPL-MCNC: 54 NG/ML (ref 20–50)
WBC # BLD AUTO: 4.9 10E3/UL (ref 4–11)

## 2023-10-06 PROCEDURE — 82306 VITAMIN D 25 HYDROXY: CPT

## 2023-10-06 PROCEDURE — 85027 COMPLETE CBC AUTOMATED: CPT

## 2023-10-06 PROCEDURE — 36415 COLL VENOUS BLD VENIPUNCTURE: CPT

## 2023-10-06 PROCEDURE — 80061 LIPID PANEL: CPT

## 2023-10-06 PROCEDURE — 80053 COMPREHEN METABOLIC PANEL: CPT

## 2023-10-09 NOTE — RESULT ENCOUNTER NOTE
Your Cholesterol is borderline normal. Given your age and no cardiac risk factors , recommend dietery management of avoiding high fat foods .     Your Vitamin D levels are abnormally high causing toxicity . Please hold off any supplements you are taking for 2 weeks and later start only on 1000  Mcg daily.    Please let me know if you have any questions.  Andressa Khanna MD on 10/9/2023  [facility]

## 2023-10-13 ENCOUNTER — ONCOLOGY VISIT (OUTPATIENT)
Dept: ONCOLOGY | Facility: CLINIC | Age: 73
End: 2023-10-13
Attending: INTERNAL MEDICINE
Payer: COMMERCIAL

## 2023-10-13 ENCOUNTER — PRE VISIT (OUTPATIENT)
Dept: ONCOLOGY | Facility: CLINIC | Age: 73
End: 2023-10-13
Payer: COMMERCIAL

## 2023-10-13 ENCOUNTER — TELEPHONE (OUTPATIENT)
Dept: FAMILY MEDICINE | Facility: CLINIC | Age: 73
End: 2023-10-13
Payer: COMMERCIAL

## 2023-10-13 VITALS
OXYGEN SATURATION: 97 % | WEIGHT: 117.2 LBS | RESPIRATION RATE: 16 BRPM | DIASTOLIC BLOOD PRESSURE: 71 MMHG | HEART RATE: 77 BPM | SYSTOLIC BLOOD PRESSURE: 112 MMHG | HEIGHT: 60 IN | BODY MASS INDEX: 23.01 KG/M2

## 2023-10-13 DIAGNOSIS — C50.911 MALIGNANT NEOPLASM OF RIGHT FEMALE BREAST, UNSPECIFIED ESTROGEN RECEPTOR STATUS, UNSPECIFIED SITE OF BREAST (H): ICD-10-CM

## 2023-10-13 DIAGNOSIS — Z00.00 ENCOUNTER FOR MEDICARE ANNUAL WELLNESS EXAM: ICD-10-CM

## 2023-10-13 DIAGNOSIS — Z79.811 LONG TERM (CURRENT) USE OF AROMATASE INHIBITORS: Primary | ICD-10-CM

## 2023-10-13 LAB
CANCER AG15-3 SERPL-ACNC: 14 U/ML
CEA SERPL-MCNC: 1.6 NG/ML
TOTAL PROTEIN SERUM FOR ELP: 6.4 G/DL (ref 6.4–8.3)

## 2023-10-13 PROCEDURE — 84155 ASSAY OF PROTEIN SERUM: CPT | Performed by: INTERNAL MEDICINE

## 2023-10-13 PROCEDURE — G0463 HOSPITAL OUTPT CLINIC VISIT: HCPCS | Performed by: INTERNAL MEDICINE

## 2023-10-13 PROCEDURE — 84165 PROTEIN E-PHORESIS SERUM: CPT | Mod: TC | Performed by: PATHOLOGY

## 2023-10-13 PROCEDURE — 83521 IG LIGHT CHAINS FREE EACH: CPT | Performed by: INTERNAL MEDICINE

## 2023-10-13 PROCEDURE — 99205 OFFICE O/P NEW HI 60 MIN: CPT | Performed by: INTERNAL MEDICINE

## 2023-10-13 PROCEDURE — 82378 CARCINOEMBRYONIC ANTIGEN: CPT | Performed by: INTERNAL MEDICINE

## 2023-10-13 PROCEDURE — 36415 COLL VENOUS BLD VENIPUNCTURE: CPT | Performed by: INTERNAL MEDICINE

## 2023-10-13 PROCEDURE — 84165 PROTEIN E-PHORESIS SERUM: CPT | Mod: 26 | Performed by: PATHOLOGY

## 2023-10-13 PROCEDURE — 86300 IMMUNOASSAY TUMOR CA 15-3: CPT | Performed by: INTERNAL MEDICINE

## 2023-10-13 RX ORDER — ALENDRONATE SODIUM 70 MG/1
70 TABLET ORAL
Qty: 90 TABLET | Refills: 3 | Status: SHIPPED | OUTPATIENT
Start: 2023-10-13 | End: 2024-04-02

## 2023-10-13 RX ORDER — ANASTROZOLE 1 MG/1
1 TABLET ORAL DAILY
Qty: 90 TABLET | Refills: 3 | Status: SHIPPED | OUTPATIENT
Start: 2023-10-13 | End: 2024-04-02

## 2023-10-13 ASSESSMENT — PAIN SCALES - GENERAL: PAINLEVEL: NO PAIN (1)

## 2023-10-13 NOTE — PATIENT INSTRUCTIONS
Dear Dakota    It was great to meet you    Below is a summary of your visit today  1.  Labs today for ruling out multiple myeloma issues we will follow the MGUS  2.  Bone density scan before you come back and see me in 6 months  3.  I will do your markers every 6 months it CEA and CA 15-3  4.  I refilled your Fosamax and anastrozole      Best    Richie Harvey MD

## 2023-10-13 NOTE — LETTER
10/13/2023         RE: Dakota Camejo  [address]  [place] [address]        Dear Colleague,    Thank you for referring your patient, Dakota Camejo, to the [facility]. Please see a copy of my visit note below.    Oncology Rooming Note    October 13, 2023 1:19 PM   Dakota Camejo is a 73 year old female who presents for:    Chief Complaint   Patient presents with     Oncology Clinic Visit     Malignant neoplasm of right female breast (H) S/P Lumpectomy in 2019     Initial Vitals: /71   Pulse 77   Resp 16   Ht 1.524 m (5')   Wt 53.2 kg (117 lb 3.2 oz)   SpO2 97%   BMI 22.89 kg/m   Estimated body mass index is 22.89 kg/m  as calculated from the following:    Height as of this encounter: 1.524 m (5').    Weight as of this encounter: 53.2 kg (117 lb 3.2 oz). Body surface area is 1.5 meters squared.  No Pain (1) Comment: Data Unavailable   No LMP recorded. Patient has had a hysterectomy.  Allergies reviewed: Yes  Medications reviewed: Yes    Medications: Medication refills not needed today.  Pharmacy name entered into [employer]: [id number] - [place], [place] - [address] AT [facility] OF [place] [address]    Clinical concerns: None       Jannie Alicea MA              [facility]    Hematology/Oncology New Patient Note      Today's Date: 10/13/2024    Reason for Consult: History of breast cancer here to establish care      HISTORY OF PRESENT ILLNESS: Dakota is a very pleasant 73-year-old female with the following oncologic history  1.  History of stage I invasive ductal carcinoma of the right breast status postlumpectomy with sentinel node biopsy T1b N0 M0, grade 1, ER +90% DC 30% HER2/jacqui FISH nonamplified.  Ki-67 10%.  Oncotype DX score of 20.  Diagnosed in October 2020.    2.  Status post radiation. Started adjuvant Arimidex in December 2020  3.  History of osteoporosis started Fosamax in 2020 January, declined Zometa  4.   History of basal cell carcinoma  5.  History of IgG kappa MGUS diagnosed in November 2022 being followed yearly    Dakota was getting her care in [place].  She recently moved to [place].  She goes to [place] in winter.  She is due for mammogram in October and its been ordered for her.  REVIEW OF SYSTEMS:   14 point ROS was reviewed and is negative other than as noted above in HPI.       HOME MEDICATIONS:  Current Outpatient Medications   Medication Sig Dispense Refill     alendronate (FOSAMAX) 70 MG tablet Take 1 tablet (70 mg) by mouth every 7 days 90 tablet 3     alendronate (FOSAMAX) 70 MG tablet Take 70 mg by mouth every 7 days       anastrozole (ARIMIDEX) 1 MG tablet Take 1 tablet (1 mg) by mouth daily 90 tablet 3     anastrozole (ARIMIDEX) 1 MG tablet Take 1 mg by mouth daily       BINAXNOW COVID-19 AG HOME TEST KIT            ALLERGIES:  Allergies   Allergen Reactions     Amoxicillin Swelling     Sulfa Antibiotics      rash     Erythromycin Swelling     hives         PAST MEDICAL HISTORY:  Noted and reviewed    PAST SURGICAL HISTORY:  Noted and reviewed      SOCIAL HISTORY:  Social History     Socioeconomic History     Marital status:      Spouse name: Not on file     Number of children: Not on file     Years of education: Not on file     Highest education level: Not on file   Occupational History     Not on file   Tobacco Use     Smoking status: Never     Smokeless tobacco: Never   Vaping Use     Vaping Use: Never used   Substance and Sexual Activity     Alcohol use: Not on file     Drug use: Not on file     Sexual activity: Not on file   Other Topics Concern     Not on file   Social History Narrative     Not on file     Social Determinants of Health     Financial Resource Strain: Low Risk  (10/2/2023)    Financial Resource Strain      Within the past 12 months, have you or your family members you live with been unable to get utilities (heat, electricity) when it was really needed?: No   Food  Insecurity: Low Risk  (10/2/2023)    Food Insecurity      Within the past 12 months, did you worry that your food would run out before you got money to buy more?: No      Within the past 12 months, did the food you bought just not last and you didn t have money to get more?: No   Transportation Needs: Low Risk  (10/2/2023)    Transportation Needs      Within the past 12 months, has lack of transportation kept you from medical appointments, getting your medicines, non-medical meetings or appointments, work, or from getting things that you need?: No   Physical Activity: Not on file   Stress: Not on file   Social Connections: Not on file   Interpersonal Safety: Low Risk  (10/3/2023)    Interpersonal Safety      Do you feel physically and emotionally safe where you currently live?: Yes      Within the past 12 months, have you been hit, slapped, kicked or otherwise physically hurt by someone?: No      Within the past 12 months, have you been humiliated or emotionally abused in other ways by your partner or ex-partner?: No   Housing Stability: Low Risk  (10/2/2023)    Housing Stability      Do you have housing? : Yes      Are you worried about losing your housing?: No         FAMILY HISTORY:  No family history on file.      PHYSICAL EXAM:  Vital signs:  /71   Pulse 77   Resp 16   Ht 1.524 m (5')   Wt 53.2 kg (117 lb 3.2 oz)   SpO2 97%   BMI 22.89 kg/m     ECO  GENERAL/CONSTITUTIONAL: No acute distress.  EYES: Pupils are equal, round, and react to light and accommodation. Extraocular movements intact.  No scleral icterus.  ENT/MOUTH: Neck supple. Oropharynx clear, no mucositis.  LYMPH: No anterior cervical, posterior cervical, supraclavicular, axillary or inguinal adenopathy.   RESPIRATORY: Clear to auscultation bilaterally. No crackles or wheezing.   CARDIOVASCULAR: Regular rate and rhythm without murmurs, gallops, or rubs.  GASTROINTESTINAL: No hepatosplenomegaly, masses, or tenderness. The patient has  normal bowel sounds. No guarding.  No distention.  MUSCULOSKELETAL: Warm and well-perfused, no cyanosis, clubbing, or edema.  NEUROLOGIC: Cranial nerves II-XII are intact. Alert, oriented, answers questions appropriately.  INTEGUMENTARY: No rashes or jaundice.  GAIT: Steady, does not use assistive device  Bilateral breast exam is normal.  No evidence of recurrence    LABS:  Noted     PATHOLOGY:  Noted as per HPI    IMAGING:  Noted as  per HPI    ASSESSMENT/PLAN:  Dakota Camejo is a 73 year old female with history of breast cancer and MGUS      1 Breast cancer  On arimidex   Mammo 10/2023 ordered      2 bone health  on fosamax since 2020  Declined zometa   Update dexa     3 labs and md visit in 6 months     Total time spent on day of visit, including review of tests, obtaining/reviewing separately obtained history, ordering medications/tests/procedures, communicating with PCP/consultants, and documenting in electronic medical record: 60  minutes       Richie Harvey MD  Hematology/Oncology  [facility]       Again, thank you for allowing me to participate in the care of your patient.        Sincerely,        Richie Harvey MD

## 2023-10-13 NOTE — PROGRESS NOTES
Oncology Rooming Note    October 13, 2023 1:19 PM   Dakota Camejo is a 73 year old female who presents for:    Chief Complaint   Patient presents with    Oncology Clinic Visit     Malignant neoplasm of right female breast (H) S/P Lumpectomy in 2019     Initial Vitals: /71   Pulse 77   Resp 16   Ht 1.524 m (5')   Wt 53.2 kg (117 lb 3.2 oz)   SpO2 97%   BMI 22.89 kg/m   Estimated body mass index is 22.89 kg/m  as calculated from the following:    Height as of this encounter: 1.524 m (5').    Weight as of this encounter: 53.2 kg (117 lb 3.2 oz). Body surface area is 1.5 meters squared.  No Pain (1) Comment: Data Unavailable   No LMP recorded. Patient has had a hysterectomy.  Allergies reviewed: Yes  Medications reviewed: Yes    Medications: Medication refills not needed today.  Pharmacy name entered into [employer]: [facility] #[id number] - [place] - [id number] AT [facility] [address] [place] [address]    Clinical concerns: None       Jannie Alicea MA

## 2023-10-13 NOTE — TELEPHONE ENCOUNTER
Action October 13, 2023 12:45 PM ABT   Action Taken Slides from [facility] received and taken to 5th floor path lab for review.    12/04/20: [id number]  10/26/20: [id number]

## 2023-10-13 NOTE — TELEPHONE ENCOUNTER
----- Message from Richie Harvey MD sent at 10/13/2023  1:43 PM CDT -----  Dear Dr Khanna  Her vitamin D looks fine for cancer prevention so I told her to stay on the same dose please let me know if you have any concerns    Best  Richie Harvey MD

## 2023-10-13 NOTE — PROGRESS NOTES
[facility]    Hematology/Oncology New Patient Note      Today's Date: 10/13/2024    Reason for Consult: History of breast cancer here to establish care      HISTORY OF PRESENT ILLNESS: Dakota is a very pleasant 73-year-old female with the following oncologic history  1.  History of stage I invasive ductal carcinoma of the right breast status postlumpectomy with sentinel node biopsy T1b N0 M0, grade 1, ER +90% OK 30% HER2/jacqui FISH nonamplified.  Ki-67 10%.  Oncotype DX score of 20.  Diagnosed in October 2020.    2.  Status post radiation. Started adjuvant Arimidex in December 2020  3.  History of osteoporosis started Fosamax in 2020 January, declined Zometa  4.  History of basal cell carcinoma  5.  History of IgG kappa MGUS diagnosed in November 2022 being followed yearly    Dakota was getting her care in [place].  She recently moved to [place].  She goes to [place] in winter.  She is due for mammogram in October and its been ordered for her.  REVIEW OF SYSTEMS:   14 point ROS was reviewed and is negative other than as noted above in HPI.       HOME MEDICATIONS:  Current Outpatient Medications   Medication Sig Dispense Refill    alendronate (FOSAMAX) 70 MG tablet Take 1 tablet (70 mg) by mouth every 7 days 90 tablet 3    alendronate (FOSAMAX) 70 MG tablet Take 70 mg by mouth every 7 days      anastrozole (ARIMIDEX) 1 MG tablet Take 1 tablet (1 mg) by mouth daily 90 tablet 3    anastrozole (ARIMIDEX) 1 MG tablet Take 1 mg by mouth daily      BINAXNOW COVID-19 AG HOME TEST KIT            ALLERGIES:  Allergies   Allergen Reactions    Amoxicillin Swelling    Sulfa Antibiotics      rash    Erythromycin Swelling     hives         PAST MEDICAL HISTORY:  Noted and reviewed    PAST SURGICAL HISTORY:  Noted and reviewed      SOCIAL HISTORY:  Social History     Socioeconomic History    Marital status:      Spouse name: Not on file    Number of children: Not on file    Years of education: Not on  file    Highest education level: Not on file   Occupational History    Not on file   Tobacco Use    Smoking status: Never    Smokeless tobacco: Never   Vaping Use    Vaping Use: Never used   Substance and Sexual Activity    Alcohol use: Not on file    Drug use: Not on file    Sexual activity: Not on file   Other Topics Concern    Not on file   Social History Narrative    Not on file     Social Determinants of Health     Financial Resource Strain: Low Risk  (10/2/2023)    Financial Resource Strain     Within the past 12 months, have you or your family members you live with been unable to get utilities (heat, electricity) when it was really needed?: No   Food Insecurity: Low Risk  (10/2/2023)    Food Insecurity     Within the past 12 months, did you worry that your food would run out before you got money to buy more?: No     Within the past 12 months, did the food you bought just not last and you didn t have money to get more?: No   Transportation Needs: Low Risk  (10/2/2023)    Transportation Needs     Within the past 12 months, has lack of transportation kept you from medical appointments, getting your medicines, non-medical meetings or appointments, work, or from getting things that you need?: No   Physical Activity: Not on file   Stress: Not on file   Social Connections: Not on file   Interpersonal Safety: Low Risk  (10/3/2023)    Interpersonal Safety     Do you feel physically and emotionally safe where you currently live?: Yes     Within the past 12 months, have you been hit, slapped, kicked or otherwise physically hurt by someone?: No     Within the past 12 months, have you been humiliated or emotionally abused in other ways by your partner or ex-partner?: No   Housing Stability: Low Risk  (10/2/2023)    Housing Stability     Do you have housing? : Yes     Are you worried about losing your housing?: No         FAMILY HISTORY:  No family history on file.      PHYSICAL EXAM:  Vital signs:  /71   Pulse 77    Resp 16   Ht 1.524 m (5')   Wt 53.2 kg (117 lb 3.2 oz)   SpO2 97%   BMI 22.89 kg/m     ECO  GENERAL/CONSTITUTIONAL: No acute distress.  EYES: Pupils are equal, round, and react to light and accommodation. Extraocular movements intact.  No scleral icterus.  ENT/MOUTH: Neck supple. Oropharynx clear, no mucositis.  LYMPH: No anterior cervical, posterior cervical, supraclavicular, axillary or inguinal adenopathy.   RESPIRATORY: Clear to auscultation bilaterally. No crackles or wheezing.   CARDIOVASCULAR: Regular rate and rhythm without murmurs, gallops, or rubs.  GASTROINTESTINAL: No hepatosplenomegaly, masses, or tenderness. The patient has normal bowel sounds. No guarding.  No distention.  MUSCULOSKELETAL: Warm and well-perfused, no cyanosis, clubbing, or edema.  NEUROLOGIC: Cranial nerves II-XII are intact. Alert, oriented, answers questions appropriately.  INTEGUMENTARY: No rashes or jaundice.  GAIT: Steady, does not use assistive device  Bilateral breast exam is normal.  No evidence of recurrence    LABS:  Noted     PATHOLOGY:  Noted as per HPI    IMAGING:  Noted as  per HPI    ASSESSMENT/PLAN:  Dakota Camejo is a 73 year old female with history of breast cancer and MGUS      1 Breast cancer  On arimidex   Mammo 10/2023 ordered      2 bone health  on fosamax since   Declined zometa   Update dexa     3 labs and md visit in 6 months     Total time spent on day of visit, including review of tests, obtaining/reviewing separately obtained history, ordering medications/tests/procedures, communicating with PCP/consultants, and documenting in electronic medical record: 60  minutes       Richie Harvey MD  Hematology/Oncology  [facility]

## 2023-10-16 LAB
KAPPA LC FREE SER-MCNC: 2.15 MG/DL (ref 0.33–1.94)
KAPPA LC FREE/LAMBDA FREE SER NEPH: 1.23 {RATIO} (ref 0.26–1.65)
LAMBDA LC FREE SERPL-MCNC: 1.75 MG/DL (ref 0.57–2.63)

## 2023-10-17 LAB
ALBUMIN SERPL ELPH-MCNC: 3.9 G/DL (ref 3.7–5.1)
ALPHA1 GLOB SERPL ELPH-MCNC: 0.3 G/DL (ref 0.2–0.4)
ALPHA2 GLOB SERPL ELPH-MCNC: 0.7 G/DL (ref 0.5–0.9)
B-GLOBULIN SERPL ELPH-MCNC: 0.7 G/DL (ref 0.6–1)
GAMMA GLOB SERPL ELPH-MCNC: 0.8 G/DL (ref 0.7–1.6)
M PROTEIN SERPL ELPH-MCNC: 0.2 G/DL
PROT PATTERN SERPL ELPH-IMP: ABNORMAL

## 2023-10-23 NOTE — PROGRESS NOTES
ASSESSMENT & PLAN    Dakota was seen today for follow up.    Diagnoses and all orders for this visit:    Other closed fracture of distal end of right radius with routine healing, subsequent encounter  -     XR Wrist Right G/E 3 Views; Future  -     Wrist/Arm Supplies Order Wrist Brace; Right; non-thumb spica      73-year-old female with a history of osteoporosis presents for follow-up on right distal radius fracture that occurred approximately 4 weeks ago.  She has been in a short arm cast for the past 3 weeks, and reports complete resolution of pain.  Repeat imaging today reveals no displacement or angulation of previously observed distal radius fracture with minimal callus formation.  She has slightly decreased range of motion of her wrist secondary to stiffness, and only very minimal tenderness to palpation at site of fracture.    Plan:  - We will transition to wrist splint today as patient was very unhappy with her cast and fracture is stable.  We discussed importance of compliance of wearing wrist splint 24/7 to prevent any fracture displacement  - Can take Tylenol if needed for pain  -Plan to follow-up in approximately 2 weeks for repeat imaging and discussion of discontinuing immobilization.  We discussed that these generally take a minimum of 6 to 8 weeks to heal, but may take slightly longer in her due to her history of osteoporosis    Return in about 2 weeks (around 11/9/2023).      Dr. Hawa Shelton, DO  [facility]  Sports Medicine     -----  Chief Complaint   Patient presents with    Right Wrist - Follow Up       SUBJECTIVE  Dakota Camejo is a/an 73 year old female who is seen to follow-up on right wrist fracture. The patient was last seen 10/5/2023. Since last visit, she has been in a cast. Denies any pain. Denies numbness, tingling. Feels weak in the wrist.       REVIEW OF SYSTEMS:  Pertinent positives/negative: As stated above in HPI    OBJECTIVE:  There were no vitals  taken for this visit.   General: Alert and in no distress  Skin: no visable rashes  CV: Extremities appear well perfused   Resp: normal respiratory effort, no conversational dyspnea   Psych: normal mood, affect  MSK:  RIGHT WRIST  Inspection:    No swelling, bruising, discoloration, or obvious deformity or asymmetry  Palpation:    Tender about the distal radius. Remainder of bony and ligamentous line marks are nontender.  Range of Motion:    full finger ROM.  Wrist range of motion slightly reduced in all directions secondary to stiffness from recent mobilization  Strength:  Good  strength         RADIOLOGY:  Final results and radiologist's interpretation available in the [facility] health record.  Images below were personally reviewed and discussed with the patient in the office today.  My personal interpretation of the performed imaging: X-ray performed in clinic today reveals redemonstration of the distal radius fracture without displacement or angulation

## 2023-10-25 ENCOUNTER — ANCILLARY PROCEDURE (OUTPATIENT)
Dept: MAMMOGRAPHY | Facility: CLINIC | Age: 73
End: 2023-10-25
Attending: INTERNAL MEDICINE
Payer: COMMERCIAL

## 2023-10-25 DIAGNOSIS — Z00.00 ENCOUNTER FOR MEDICARE ANNUAL WELLNESS EXAM: ICD-10-CM

## 2023-10-25 DIAGNOSIS — Z12.31 VISIT FOR SCREENING MAMMOGRAM: ICD-10-CM

## 2023-10-25 PROCEDURE — 77067 SCR MAMMO BI INCL CAD: CPT | Mod: TC | Performed by: RADIOLOGY

## 2023-10-26 ENCOUNTER — OFFICE VISIT (OUTPATIENT)
Dept: ORTHOPEDICS | Facility: CLINIC | Age: 73
End: 2023-10-26
Payer: COMMERCIAL

## 2023-10-26 ENCOUNTER — ANCILLARY PROCEDURE (OUTPATIENT)
Dept: GENERAL RADIOLOGY | Facility: CLINIC | Age: 73
End: 2023-10-26
Attending: STUDENT IN AN ORGANIZED HEALTH CARE EDUCATION/TRAINING PROGRAM
Payer: COMMERCIAL

## 2023-10-26 DIAGNOSIS — S52.591D OTHER CLOSED FRACTURE OF DISTAL END OF RIGHT RADIUS WITH ROUTINE HEALING, SUBSEQUENT ENCOUNTER: ICD-10-CM

## 2023-10-26 DIAGNOSIS — S52.591D OTHER CLOSED FRACTURE OF DISTAL END OF RIGHT RADIUS WITH ROUTINE HEALING, SUBSEQUENT ENCOUNTER: Primary | ICD-10-CM

## 2023-10-26 PROCEDURE — 99213 OFFICE O/P EST LOW 20 MIN: CPT | Performed by: STUDENT IN AN ORGANIZED HEALTH CARE EDUCATION/TRAINING PROGRAM

## 2023-10-26 PROCEDURE — 73110 X-RAY EXAM OF WRIST: CPT | Mod: TC | Performed by: RADIOLOGY

## 2023-10-26 NOTE — LETTER
10/26/2023         RE: Dakota Camejo  [address]  [place] [address]        Dear Colleague,    Thank you for referring your patient, Dakota Camejo, to the [facility] [place]. Please see a copy of my visit note below.    ASSESSMENT & PLAN    Dakota was seen today for follow up.    Diagnoses and all orders for this visit:    Other closed fracture of distal end of right radius with routine healing, subsequent encounter  -     XR Wrist Right G/E 3 Views; Future  -     Wrist/Arm Supplies Order Wrist Brace; Right; non-thumb spica      73-year-old female with a history of osteoporosis presents for follow-up on right distal radius fracture that occurred approximately 4 weeks ago.  She has been in a short arm cast for the past 3 weeks, and reports complete resolution of pain.  Repeat imaging today reveals no displacement or angulation of previously observed distal radius fracture with minimal callus formation.  She has slightly decreased range of motion of her wrist secondary to stiffness, and only very minimal tenderness to palpation at site of fracture.    Plan:  - We will transition to wrist splint today as patient was very unhappy with her cast and fracture is stable.  We discussed importance of compliance of wearing wrist splint 24/7 to prevent any fracture displacement  - Can take Tylenol if needed for pain  -Plan to follow-up in approximately 2 weeks for repeat imaging and discussion of discontinuing immobilization.  We discussed that these generally take a minimum of 6 to 8 weeks to heal, but may take slightly longer in her due to her history of osteoporosis    Return in about 2 weeks (around 11/9/2023).      Dr. Hawa Shelton,   [facility]  Sports Medicine     -----  Chief Complaint   Patient presents with     Right Wrist - Follow Up       SUBJECTIVE  Dakota Camejo is a/an 73 year old female who is seen to follow-up on right wrist  fracture. The patient was last seen 10/5/2023. Since last visit, she has been in a cast. Denies any pain. Denies numbness, tingling. Feels weak in the wrist.       REVIEW OF SYSTEMS:  Pertinent positives/negative: As stated above in HPI    OBJECTIVE:  There were no vitals taken for this visit.   General: Alert and in no distress  Skin: no visable rashes  CV: Extremities appear well perfused   Resp: normal respiratory effort, no conversational dyspnea   Psych: normal mood, affect  MSK:  RIGHT WRIST  Inspection:    No swelling, bruising, discoloration, or obvious deformity or asymmetry  Palpation:    Tender about the distal radius. Remainder of bony and ligamentous line marks are nontender.  Range of Motion:    full finger ROM.  Wrist range of motion slightly reduced in all directions secondary to stiffness from recent mobilization  Strength:  Good  strength         RADIOLOGY:  Final results and radiologist's interpretation available in the [facility] health record.  Images below were personally reviewed and discussed with the patient in the office today.  My personal interpretation of the performed imaging: X-ray performed in clinic today reveals redemonstration of the distal radius fracture without displacement or angulation                   Again, thank you for allowing me to participate in the care of your patient.        Sincerely,        Hawa Shelton, DO

## 2023-10-26 NOTE — PATIENT INSTRUCTIONS
Thank you for choosing [facility]!    DR. VELÁSQUEZ'S CLINIC LOCATIONS:     [place]  TRIAGE LINE: [phone] [address] [facility] [address] [facility] APPOINTMENTS: [phone]   [place] [address] RADIOLOGY: [phone]   (Mondays & Tuesdays) HAND THERAPY: [phone]    PHYSICAL THERAPY: [phone]   [place] BILLING QUESTIONS: [phone] [address] FAX: [phone]   [place] [address]    (Thursdays & Fridays)

## 2023-11-09 ENCOUNTER — OFFICE VISIT (OUTPATIENT)
Dept: ORTHOPEDICS | Facility: CLINIC | Age: 73
End: 2023-11-09
Payer: COMMERCIAL

## 2023-11-09 ENCOUNTER — ANCILLARY PROCEDURE (OUTPATIENT)
Dept: GENERAL RADIOLOGY | Facility: CLINIC | Age: 73
End: 2023-11-09
Attending: STUDENT IN AN ORGANIZED HEALTH CARE EDUCATION/TRAINING PROGRAM
Payer: COMMERCIAL

## 2023-11-09 VITALS — WEIGHT: 117 LBS | HEIGHT: 60 IN | BODY MASS INDEX: 22.97 KG/M2

## 2023-11-09 DIAGNOSIS — S52.591D OTHER CLOSED FRACTURE OF DISTAL END OF RIGHT RADIUS WITH ROUTINE HEALING, SUBSEQUENT ENCOUNTER: Primary | ICD-10-CM

## 2023-11-09 DIAGNOSIS — S52.591D OTHER CLOSED FRACTURE OF DISTAL END OF RIGHT RADIUS WITH ROUTINE HEALING, SUBSEQUENT ENCOUNTER: ICD-10-CM

## 2023-11-09 PROCEDURE — 73110 X-RAY EXAM OF WRIST: CPT | Mod: TC | Performed by: RADIOLOGY

## 2023-11-09 PROCEDURE — 99213 OFFICE O/P EST LOW 20 MIN: CPT | Performed by: STUDENT IN AN ORGANIZED HEALTH CARE EDUCATION/TRAINING PROGRAM

## 2023-11-09 NOTE — PROGRESS NOTES
ASSESSMENT & PLAN    Dakota was seen today for follow up.    Diagnoses and all orders for this visit:    Other closed fracture of distal end of right radius with routine healing, subsequent encounter  -     XR Wrist Right G/E 3 Views; Future      73-year-old female with a history of osteoporosis presents to follow-up on right distal radius fracture sustained almost 6 weeks ago.  She was transitioned from a cast to a splint 2 weeks ago, and has been doing well in this.  She reports that her pain is significantly improved over the past week.  On exam today, she only has mild tenderness to palpation of the distal radius with otherwise intact neurovascular exam and some wrist stiffness.  X-ray today does show some interval healing of her fracture.    Plan:  - Continue to wear right wrist splint at all times except for bathing  - Can take Tylenol as needed for pain  - Can continue to work on finger range of motion while in splint  - Plan to follow-up in approximately 3 weeks with repeat imaging.  We will discuss potentially weaning out of the brace at that time and consider occupational therapy to help with wrist stiffness    Return in about 3 weeks (around 11/30/2023).      Dr. Hawa Shelton, DO  [facility]  Sports Medicine     -----  Chief Complaint   Patient presents with    Right Wrist - Follow Up       SUBJECTIVE  Dakota Camejo is a/an 73 year old female who is seen in follow-up right distal radius fracture sustained 9/29 after a fall.  She was last seen on 10/26 and was transitioned from a cast to a splint at that time. Patient reports that her wrist has been feeling better since her last visit. She reports that she still feels some tenderness but has been wearing the splint at all times besides for bathing.  Over the past week, she reports that her pain has significantly improved and is currently not taking any medications for pain.    The patient is seen  alone    Location/characterization of Pain: right radial wrist  Current method of immobilization?: splint  Associated symptoms:  None      REVIEW OF SYSTEMS:  Pertinent positives/negative: As stated above in HPI    OBJECTIVE:  Ht 1.524 m (5')   Wt 53.1 kg (117 lb)   BMI 22.85 kg/m     General: Alert and in no distress  Skin: no visable rashes  CV: Extremities appear well perfused   Resp: normal respiratory effort, no conversational dyspnea   Psych: normal mood, affect  MSK:  RIGHT WRIST  Inspection:    No swelling, bruising, discoloration, or obvious deformity or asymmetry  Palpation:    Tender about the distal radius. Remainder of bony and ligamentous line marks are nontender.  Range of Motion:  Reduced in all planes secondary to stiffness  Intact radial pulse, sensation grossly intact to light touch in radial, median, and ulnar nerve distribution       RADIOLOGY:  Final results and radiologist's interpretation available in the [facility] health record.  Images were personally reviewed and discussed with the patient in the office today.  My personal interpretation of the performed imaging: x-ray of the right wrist performed today in clinic shows redemonstration of distal radius fracture with improvement evidence of minimal bony healing, no displacement or angulation

## 2023-11-09 NOTE — LETTER
11/9/2023         RE: Dakota Camejo  [address]  [place] [address]        Dear Colleague,    Thank you for referring your patient, Dakota Camejo, to the [facility] [place]. Please see a copy of my visit note below.    ASSESSMENT & PLAN    Dakota was seen today for follow up.    Diagnoses and all orders for this visit:    Other closed fracture of distal end of right radius with routine healing, subsequent encounter  -     XR Wrist Right G/E 3 Views; Future      73-year-old female with a history of osteoporosis presents to follow-up on right distal radius fracture sustained almost 6 weeks ago.  She was transitioned from a cast to a splint 2 weeks ago, and has been doing well in this.  She reports that her pain is significantly improved over the past week.  On exam today, she only has mild tenderness to palpation of the distal radius with otherwise intact neurovascular exam and some wrist stiffness.  X-ray today does show some interval healing of her fracture.    Plan:  - Continue to wear right wrist splint at all times except for bathing  - Can take Tylenol as needed for pain  - Can continue to work on finger range of motion while in splint  - Plan to follow-up in approximately 3 weeks with repeat imaging.  We will discuss potentially weaning out of the brace at that time and consider occupational therapy to help with wrist stiffness    Return in about 3 weeks (around 11/30/2023).      Dr. Hawa Shelton, DO  [facility]  Sports Medicine     -----  Chief Complaint   Patient presents with     Right Wrist - Follow Up       SUBJECTIVE  Dakota Camejo is a/an 73 year old female who is seen in follow-up right distal radius fracture sustained 9/29 after a fall.  She was last seen on 10/26 and was transitioned from a cast to a splint at that time. Patient reports that her wrist has been feeling better since her last visit. She reports that she still  feels some tenderness but has been wearing the splint at all times besides for bathing.  Over the past week, she reports that her pain has significantly improved and is currently not taking any medications for pain.    The patient is seen alone    Location/characterization of Pain: right radial wrist  Current method of immobilization?: splint  Associated symptoms:  None      REVIEW OF SYSTEMS:  Pertinent positives/negative: As stated above in HPI    OBJECTIVE:  Ht 1.524 m (5')   Wt 53.1 kg (117 lb)   BMI 22.85 kg/m     General: Alert and in no distress  Skin: no visable rashes  CV: Extremities appear well perfused   Resp: normal respiratory effort, no conversational dyspnea   Psych: normal mood, affect  MSK:  RIGHT WRIST  Inspection:    No swelling, bruising, discoloration, or obvious deformity or asymmetry  Palpation:    Tender about the distal radius. Remainder of bony and ligamentous line marks are nontender.  Range of Motion:  Reduced in all planes secondary to stiffness  Intact radial pulse, sensation grossly intact to light touch in radial, median, and ulnar nerve distribution       RADIOLOGY:  Final results and radiologist's interpretation available in the [facility] health record.  Images were personally reviewed and discussed with the patient in the office today.  My personal interpretation of the performed imaging: x-ray of the right wrist performed today in clinic shows redemonstration of distal radius fracture with improvement evidence of minimal bony healing, no displacement or angulation                   Again, thank you for allowing me to participate in the care of your patient.        Sincerely,        Hawa Shelton, DO

## 2023-11-09 NOTE — PATIENT INSTRUCTIONS
Thank you for choosing [facility]!    DR. VELÁSQUEZ'S CLINIC LOCATIONS:     [place]  TRIAGE LINE: [phone] [address] [facility] APPOINTMENTS: [phone]   [place] [address] RADIOLOGY: [phone]   (Mondays & Tuesdays) HAND THERAPY: [phone]    PHYSICAL THERAPY: [phone]   [place] BILLING QUESTIONS: [phone] [address] FAX: [phone]   [place] [address]    (Thursdays & Fridays)

## 2023-11-30 ENCOUNTER — OFFICE VISIT (OUTPATIENT)
Dept: ORTHOPEDICS | Facility: CLINIC | Age: 73
End: 2023-11-30
Payer: COMMERCIAL

## 2023-11-30 ENCOUNTER — ANCILLARY PROCEDURE (OUTPATIENT)
Dept: GENERAL RADIOLOGY | Facility: CLINIC | Age: 73
End: 2023-11-30
Attending: STUDENT IN AN ORGANIZED HEALTH CARE EDUCATION/TRAINING PROGRAM
Payer: COMMERCIAL

## 2023-11-30 VITALS
SYSTOLIC BLOOD PRESSURE: 117 MMHG | HEART RATE: 65 BPM | WEIGHT: 118 LBS | HEIGHT: 61 IN | DIASTOLIC BLOOD PRESSURE: 78 MMHG | BODY MASS INDEX: 22.28 KG/M2

## 2023-11-30 DIAGNOSIS — S52.591D OTHER CLOSED FRACTURE OF DISTAL END OF RIGHT RADIUS WITH ROUTINE HEALING, SUBSEQUENT ENCOUNTER: ICD-10-CM

## 2023-11-30 DIAGNOSIS — S52.591D OTHER CLOSED FRACTURE OF DISTAL END OF RIGHT RADIUS WITH ROUTINE HEALING, SUBSEQUENT ENCOUNTER: Primary | ICD-10-CM

## 2023-11-30 PROCEDURE — 99213 OFFICE O/P EST LOW 20 MIN: CPT | Performed by: STUDENT IN AN ORGANIZED HEALTH CARE EDUCATION/TRAINING PROGRAM

## 2023-11-30 PROCEDURE — 73110 X-RAY EXAM OF WRIST: CPT | Mod: TC | Performed by: RADIOLOGY

## 2023-11-30 NOTE — PATIENT INSTRUCTIONS
Thank you for choosing [facility]!    DR. VELÁSQUEZ'S CLINIC LOCATIONS:     [place]  TRIAGE LINE: [phone] [address] [facility] APPOINTMENTS: [phone]   [place] [address] RADIOLOGY: [phone]   (Mondays & Tuesdays) HAND THERAPY: [phone]    PHYSICAL THERAPY: [phone]   [place] BILLING QUESTIONS: [phone] [address] FAX: [phone]   [place] [address]    (Thursdays & Fridays)

## 2023-11-30 NOTE — PROGRESS NOTES
ASSESSMENT & PLAN    Dakota was seen today for pain.    Diagnoses and all orders for this visit:    Other closed fracture of distal end of right radius with routine healing, subsequent encounter  -     XR Wrist Right G/E 3 Views; Future      73-year-old female with osteoporosis presents to follow-up on a right distal radius fracture sustained 9/29 after a fall.  She has continued to have some improvement in pain, and it has not completely resolved and she has started gradually weaning the brace over the past week.  Repeat imaging today shows interval healing of her right distal radius fracture with good alignment.  She no longer has any tenderness to palpation today, but does have some residual stiffness from wearing the brace.    Plan:  - Gradually wean out of wrist brace as tolerated  - Continue with home osteoporosis medication and vitamin D/calcium  - Provided with home exercise program today to work on range of motion and strengthening  - Return to activity as tolerated, can modify yoga poses as needed until wrist range of motion improves  -Discussed with patient that if she continues to have significant wrist stiffness or weakness after a few weeks we could consider referral to hand therapy, and encouraged her to return to clinic for referral if this happens    Return if symptoms worsen or fail to improve.      Dr. Hawa Shelton, DO  [facility]  Sports Medicine     -----  Chief Complaint   Patient presents with    Right Wrist - Pain       SUBJECTIVE  Dakota Camejo is a/an 73 year old female who is seen to follow-up on a closed fracture of distal end of right radius sustained after a fall, and was transitioned to a splint at last visit. The patient was last seen 11/09/2023. Since last visit, she reports that her wrist has been doing very well, wearing the brace most of the time with no pain.    The patient is seen alone      REVIEW OF SYSTEMS:  Pertinent positives/negative: As  "stated above in HPI    OBJECTIVE:  /78   Pulse 65   Ht 1.546 m (5' 0.85\")   Wt 53.5 kg (118 lb)   BMI 22.41 kg/m     General: Alert and in no distress  Skin: no visable rashes  CV: Extremities appear well perfused   Resp: normal respiratory effort, no conversational dyspnea   Psych: normal mood, affect  MSK:  RIGHT WRIST  Inspection:    No swelling, bruising, discoloration, or obvious deformity or asymmetry  Palpation:    Bony and ligamentous line marks are nontender.  Range of Motion:  Limited by stiffness in both flexion and extension  Strength:  No deficits in flexion, extension, ulnar/radial deviation, or  strength.  Sensation grossly intact to light touch    RADIOLOGY:  Final results and radiologist's interpretation available in the [facility] health record.  Images below were personally reviewed and discussed with the patient in the office today.  My personal interpretation of the performed imaging: X-ray of the right wrist today reveals interval healing of right distal radius fracture with good alignment                 "

## 2023-11-30 NOTE — LETTER
11/30/2023         RE: Dakota Camejo  [address]  [place] [address]        Dear Colleague,    Thank you for referring your patient, Dakota Camejo, to the [facility] [place]. Please see a copy of my visit note below.    ASSESSMENT & PLAN    Dakota was seen today for pain.    Diagnoses and all orders for this visit:    Other closed fracture of distal end of right radius with routine healing, subsequent encounter  -     XR Wrist Right G/E 3 Views; Future      73-year-old female with osteoporosis presents to follow-up on a right distal radius fracture sustained 9/29 after a fall.  She has continued to have some improvement in pain, and it has not completely resolved and she has started gradually weaning the brace over the past week.  Repeat imaging today shows interval healing of her right distal radius fracture with good alignment.  She no longer has any tenderness to palpation today, but does have some residual stiffness from wearing the brace.    Plan:  - Gradually wean out of wrist brace as tolerated  - Continue with home osteoporosis medication and vitamin D/calcium  - Provided with home exercise program today to work on range of motion and strengthening  - Return to activity as tolerated, can modify yoga poses as needed until wrist range of motion improves  -Discussed with patient that if she continues to have significant wrist stiffness or weakness after a few weeks we could consider referral to hand therapy, and encouraged her to return to clinic for referral if this happens    Return if symptoms worsen or fail to improve.      Dr. Hawa Shelton, DO  [facility]  Sports Medicine     -----  Chief Complaint   Patient presents with     Right Wrist - Pain       SUBJECTIVE  Dakota Camejo is a/an 73 year old female who is seen to follow-up on a closed fracture of distal end of right radius sustained after a fall, and was transitioned to a  "splint at last visit. The patient was last seen 11/09/2023. Since last visit, she reports that her wrist has been doing very well, wearing the brace most of the time with no pain.    The patient is seen alone      REVIEW OF SYSTEMS:  Pertinent positives/negative: As stated above in HPI    OBJECTIVE:  /78   Pulse 65   Ht 1.546 m (5' 0.85\")   Wt 53.5 kg (118 lb)   BMI 22.41 kg/m     General: Alert and in no distress  Skin: no visable rashes  CV: Extremities appear well perfused   Resp: normal respiratory effort, no conversational dyspnea   Psych: normal mood, affect  MSK:  RIGHT WRIST  Inspection:    No swelling, bruising, discoloration, or obvious deformity or asymmetry  Palpation:    Bony and ligamentous line marks are nontender.  Range of Motion:  Limited by stiffness in both flexion and extension  Strength:  No deficits in flexion, extension, ulnar/radial deviation, or  strength.  Sensation grossly intact to light touch    RADIOLOGY:  Final results and radiologist's interpretation available in the [facility] health record.  Images below were personally reviewed and discussed with the patient in the office today.  My personal interpretation of the performed imaging: X-ray of the right wrist today reveals interval healing of right distal radius fracture with good alignment                   Again, thank you for allowing me to participate in the care of your patient.        Sincerely,        Hawa Shelton, DO  "

## 2024-01-16 ENCOUNTER — DOCUMENTATION ONLY (OUTPATIENT)
Dept: ONCOLOGY | Facility: CLINIC | Age: 74
End: 2024-01-16
Payer: COMMERCIAL

## 2024-01-16 NOTE — PROGRESS NOTES
Dakota Carrasquillosharon has an upcoming lab appointment:    Future Appointments   Date Time Provider Department Center   1/18/2024  1:45 PM EC LAB ECLABR EC   2/1/2024  2:30 PM [id number] SHDEXA [facility] BC ZANDRA   3/12/2024 10:30 AM Kandi Anderson OD EAOPTM JOSE C   3/19/2024  1:30 PM Andressa Khanna MD ECFP EC     Patient is scheduled for the following lab(s): unknown labs    There is no order available. Please review and place either future orders or HMPO (Review of Health Maintenance Protocol Orders), as appropriate.    Health Maintenance Due   Topic    HEPATITIS C SCREENING      Alisa Greene

## 2024-01-17 DIAGNOSIS — C50.911 MALIGNANT NEOPLASM OF RIGHT FEMALE BREAST, UNSPECIFIED ESTROGEN RECEPTOR STATUS, UNSPECIFIED SITE OF BREAST (H): ICD-10-CM

## 2024-01-17 DIAGNOSIS — D47.2 MGUS (MONOCLONAL GAMMOPATHY OF UNKNOWN SIGNIFICANCE): Primary | ICD-10-CM

## 2024-01-18 ENCOUNTER — LAB (OUTPATIENT)
Dept: LAB | Facility: CLINIC | Age: 74
End: 2024-01-18
Payer: COMMERCIAL

## 2024-01-18 DIAGNOSIS — C50.911 MALIGNANT NEOPLASM OF RIGHT FEMALE BREAST, UNSPECIFIED ESTROGEN RECEPTOR STATUS, UNSPECIFIED SITE OF BREAST (H): ICD-10-CM

## 2024-01-18 DIAGNOSIS — D47.2 MGUS (MONOCLONAL GAMMOPATHY OF UNKNOWN SIGNIFICANCE): ICD-10-CM

## 2024-01-18 DIAGNOSIS — Z11.59 NEED FOR HEPATITIS C SCREENING TEST: ICD-10-CM

## 2024-01-18 PROCEDURE — 86300 IMMUNOASSAY TUMOR CA 15-3: CPT

## 2024-01-18 PROCEDURE — 84165 PROTEIN E-PHORESIS SERUM: CPT | Performed by: STUDENT IN AN ORGANIZED HEALTH CARE EDUCATION/TRAINING PROGRAM

## 2024-01-18 PROCEDURE — 83521 IG LIGHT CHAINS FREE EACH: CPT

## 2024-01-18 PROCEDURE — 82378 CARCINOEMBRYONIC ANTIGEN: CPT

## 2024-01-18 PROCEDURE — 86803 HEPATITIS C AB TEST: CPT

## 2024-01-18 PROCEDURE — 84155 ASSAY OF PROTEIN SERUM: CPT

## 2024-01-18 PROCEDURE — 36415 COLL VENOUS BLD VENIPUNCTURE: CPT

## 2024-01-19 LAB
ALBUMIN SERPL ELPH-MCNC: 4.1 G/DL (ref 3.7–5.1)
ALPHA1 GLOB SERPL ELPH-MCNC: 0.3 G/DL (ref 0.2–0.4)
ALPHA2 GLOB SERPL ELPH-MCNC: 0.6 G/DL (ref 0.5–0.9)
B-GLOBULIN SERPL ELPH-MCNC: 0.7 G/DL (ref 0.6–1)
CANCER AG15-3 SERPL-ACNC: 15 U/ML
CEA SERPL-MCNC: 1.8 NG/ML
GAMMA GLOB SERPL ELPH-MCNC: 0.8 G/DL (ref 0.7–1.6)
HCV AB SERPL QL IA: NONREACTIVE
KAPPA LC FREE SER-MCNC: 1.81 MG/DL (ref 0.33–1.94)
KAPPA LC FREE/LAMBDA FREE SER NEPH: 0.91 {RATIO} (ref 0.26–1.65)
LAMBDA LC FREE SERPL-MCNC: 1.99 MG/DL (ref 0.57–2.63)
M PROTEIN SERPL ELPH-MCNC: 0.2 G/DL
PROT PATTERN SERPL ELPH-IMP: ABNORMAL
TOTAL PROTEIN SERUM FOR ELP: 6.6 G/DL (ref 6.4–8.3)

## 2024-02-01 ENCOUNTER — HOSPITAL ENCOUNTER (OUTPATIENT)
Dept: BONE DENSITY | Facility: CLINIC | Age: 74
Discharge: HOME | End: 2024-02-01
Attending: INTERNAL MEDICINE | Admitting: INTERNAL MEDICINE
Payer: COMMERCIAL

## 2024-02-01 DIAGNOSIS — Z79.811 LONG TERM (CURRENT) USE OF AROMATASE INHIBITORS: ICD-10-CM

## 2024-02-01 DIAGNOSIS — C50.911 MALIGNANT NEOPLASM OF RIGHT FEMALE BREAST, UNSPECIFIED ESTROGEN RECEPTOR STATUS, UNSPECIFIED SITE OF BREAST (H): ICD-10-CM

## 2024-02-01 DIAGNOSIS — Z00.00 ENCOUNTER FOR MEDICARE ANNUAL WELLNESS EXAM: ICD-10-CM

## 2024-02-01 PROCEDURE — 77080 DXA BONE DENSITY AXIAL: CPT

## 2024-02-05 ENCOUNTER — OFFICE VISIT (OUTPATIENT)
Dept: FAMILY MEDICINE | Facility: CLINIC | Age: 74
End: 2024-02-05
Payer: COMMERCIAL

## 2024-02-05 VITALS
HEART RATE: 68 BPM | WEIGHT: 116 LBS | TEMPERATURE: 97.2 F | BODY MASS INDEX: 22.78 KG/M2 | OXYGEN SATURATION: 100 % | HEIGHT: 60 IN | RESPIRATION RATE: 18 BRPM | SYSTOLIC BLOOD PRESSURE: 109 MMHG | DIASTOLIC BLOOD PRESSURE: 62 MMHG

## 2024-02-05 DIAGNOSIS — H66.004 RECURRENT ACUTE SUPPURATIVE OTITIS MEDIA OF RIGHT EAR WITHOUT SPONTANEOUS RUPTURE OF TYMPANIC MEMBRANE: Primary | ICD-10-CM

## 2024-02-05 PROCEDURE — 99213 OFFICE O/P EST LOW 20 MIN: CPT | Performed by: PHYSICIAN ASSISTANT

## 2024-02-05 RX ORDER — RESPIRATORY SYNCYTIAL VIRUS VACCINE 120MCG/0.5
0.5 KIT INTRAMUSCULAR ONCE
Qty: 1 EACH | Refills: 0 | Status: CANCELLED | OUTPATIENT
Start: 2024-02-05 | End: 2024-02-05

## 2024-02-05 RX ORDER — CEFDINIR 300 MG/1
300 CAPSULE ORAL 2 TIMES DAILY
Qty: 14 CAPSULE | Refills: 0 | Status: SHIPPED | OUTPATIENT
Start: 2024-02-05 | End: 2024-02-12

## 2024-02-05 ASSESSMENT — PAIN SCALES - GENERAL: PAINLEVEL: NO PAIN (0)

## 2024-02-05 NOTE — PROGRESS NOTES
Assessment & Plan       ICD-10-CM    1. Recurrent acute suppurative otitis media of right ear without spontaneous rupture of tympanic membrane  H66.004 cefdinir (OMNICEF) 300 MG capsule        Evidence of developing Rt AOM, do not recommend air travel until cleared due to elevated risk of TM rupture 2/2 altitude change. Take Omnicef as prescribed (history rash with amoxicillin, low likelihood of cross reactivity). Apply warm compress to ear prn pain. Suspect eye symptoms 2/2 developing AOM and likely superimposed viral infection, no evidence if bacterial conjunctivitis. OTC artificial tears prn irritation.    Aden Duncan is a 74 year old, presenting for the following health issues:  Conjunctivitis (Patient is here for conjunctivitis and sore throat.  Ears feel stuffy.)        2/5/2024    11:13 AM   Additional Questions   Roomed by Adam Ortiz LPN/MA     History of Present Illness       Reason for visit:  Pink eye and other upper respiratory ailments  Symptom onset:  3-7 days ago  Symptoms include:  Sore throat, eye discharge, ears feel stuffy  Symptom intensity:  Moderate  Symptom progression:  Staying the same  Had these symptoms before:  Yes  Has tried/received treatment for these symptoms:  Yes  Previous treatment was successful:  Yes  Prior treatment description:  Antibiotics, humidifier    She eats 4 or more servings of fruits and vegetables daily.She consumes 0 sweetened beverage(s) daily.She exercises with enough effort to increase her heart rate 30 to 60 minutes per day.  She exercises with enough effort to increase her heart rate 6 days per week.   She is taking medications regularly.     Developed sx 5 days ago, started with ST. Yesterday, developed excess watering of Rt eye, FB sensation, and crusting shut. Notes intermittent cough. Developed stuffiness of ears today. Concerned as she has a flight to [place] scheduled tomorrow; reports history AOM with TM rupture.      Review of  Systems  Constitutional, neuro, ENT, endocrine, pulmonary, cardiac, gastrointestinal, genitourinary, musculoskeletal, integument and psychiatric systems are negative, except as otherwise noted.      Objective    /62 (BP Location: Right arm, Patient Position: Sitting, Cuff Size: Adult Regular)   Pulse 68   Temp 97.2  F (36.2  C) (Temporal)   Resp 18   Ht 1.524 m (5')   Wt 52.6 kg (116 lb)   SpO2 100%   BMI 22.65 kg/m    Body mass index is 22.65 kg/m .  Physical Exam   GENERAL:  WDWN, no acute distress  PSYCH: pleasant, cooperative  EYES: Rt conjunctivae injected, scant watery discharge. Lt conjunctivae normal.  HENT:  Normocephalic. Moist mucus membranes. Ear canals clear. Lt TM serous effusion, landmarks visible, TM pink. Rt TM TM dull gray, purulent effusion, landmarks not visible. Oropharynx pink, uvula midline, clear PND  NECK:  Supple, symmetric, no LIGIA  LUNGS:  Clear to auscultation bilaterally without rhonchi, rales, or wheeze. Chest rise symmetric and no tenderness to palpation.  HEART:  Regular rate & rhythm. No murmur, gallop, or rub.  EXTREMITIES:  No gross deformities, moves all 4 limbs spontaneously  SKIN:  Warm and dry, no rash or suspicious lesions    NEUROLOGIC:  alert, sensation grossly intact.            Signed Electronically by: Daniela Katz PA-C

## 2024-03-12 ENCOUNTER — OFFICE VISIT (OUTPATIENT)
Dept: OPTOMETRY | Facility: CLINIC | Age: 74
End: 2024-03-12
Attending: INTERNAL MEDICINE
Payer: COMMERCIAL

## 2024-03-12 DIAGNOSIS — H52.03 HYPEROPIA OF BOTH EYES WITH ASTIGMATISM: Primary | ICD-10-CM

## 2024-03-12 DIAGNOSIS — H52.203 HYPEROPIA OF BOTH EYES WITH ASTIGMATISM: Primary | ICD-10-CM

## 2024-03-12 DIAGNOSIS — Z96.1 PSEUDOPHAKIA: ICD-10-CM

## 2024-03-12 DIAGNOSIS — H25.12 NUCLEAR SCLEROSIS OF LEFT EYE: ICD-10-CM

## 2024-03-12 DIAGNOSIS — Z01.00 ENCOUNTER FOR EXAMINATION OF VISION: ICD-10-CM

## 2024-03-12 DIAGNOSIS — H04.129 DRY EYE: ICD-10-CM

## 2024-03-12 DIAGNOSIS — Z00.00 ENCOUNTER FOR MEDICARE ANNUAL WELLNESS EXAM: ICD-10-CM

## 2024-03-12 PROCEDURE — 92015 DETERMINE REFRACTIVE STATE: CPT | Performed by: OPTOMETRIST

## 2024-03-12 PROCEDURE — 92004 COMPRE OPH EXAM NEW PT 1/>: CPT | Performed by: OPTOMETRIST

## 2024-03-12 ASSESSMENT — CONF VISUAL FIELD
OD_INFERIOR_NASAL_RESTRICTION: 0
OS_SUPERIOR_NASAL_RESTRICTION: 0
OD_NORMAL: 1
OD_INFERIOR_TEMPORAL_RESTRICTION: 0
OS_NORMAL: 1
OS_INFERIOR_NASAL_RESTRICTION: 0
OD_SUPERIOR_TEMPORAL_RESTRICTION: 0
OS_INFERIOR_TEMPORAL_RESTRICTION: 0
OD_SUPERIOR_NASAL_RESTRICTION: 0
METHOD: COUNTING FINGERS
OS_SUPERIOR_TEMPORAL_RESTRICTION: 0

## 2024-03-12 ASSESSMENT — VISUAL ACUITY
CORRECTION_TYPE: GLASSES
OS_CC: 20/30-2
OS_SC: 20/50
OS_CC: 20/30
METHOD: SNELLEN - LINEAR
OS_SC+: -2
OD_SC: 20/20
OD_CC: 20/30
OD_CC: 20/25

## 2024-03-12 ASSESSMENT — REFRACTION_MANIFEST
OS_SPHERE: +0.75
OS_CYLINDER: +0.50
OS_AXIS: 125
OD_AXIS: 060
OS_ADD: +2.75
OD_SPHERE: -0.25
OD_CYLINDER: +0.75
OD_ADD: +2.75
OS_CYLINDER: +0.50
OD_CYLINDER: +0.50
OS_AXIS: 109
METHOD_AUTOREFRACTION: 1
OD_AXIS: 058
OS_SPHERE: +0.75
OD_SPHERE: -0.50

## 2024-03-12 ASSESSMENT — CUP TO DISC RATIO
OD_RATIO: 0.25
OS_RATIO: 0.3/0.2

## 2024-03-12 ASSESSMENT — KERATOMETRY
OS_AXISANGLE_DEGREES: 85
OD_AXISANGLE_DEGREES: 65
OS_AXISANGLE2_DEGREES: 175
OS_K2POWER_DIOPTERS: 46.75
OD_AXISANGLE2_DEGREES: 155
OD_K2POWER_DIOPTERS: 46.12
OS_K1POWER_DIOPTERS: 45.37
OD_K1POWER_DIOPTERS: 45

## 2024-03-12 ASSESSMENT — REFRACTION_WEARINGRX
OD_ADD: +2.50
OS_ADD: +2.50
OS_CYLINDER: +0.50
OD_AXIS: 078
OS_AXIS: 060
OS_SPHERE: +1.00
SPECS_TYPE: PAL
OD_SPHERE: -0.50
OD_CYLINDER: +0.75

## 2024-03-12 ASSESSMENT — EXTERNAL EXAM - LEFT EYE: OS_EXAM: NORMAL

## 2024-03-12 ASSESSMENT — TONOMETRY
OS_IOP_MMHG: 14
OD_IOP_MMHG: 14
IOP_METHOD: APPLANATION

## 2024-03-12 ASSESSMENT — SLIT LAMP EXAM - LIDS
COMMENTS: NORMAL
COMMENTS: NORMAL

## 2024-03-12 ASSESSMENT — EXTERNAL EXAM - RIGHT EYE: OD_EXAM: NORMAL

## 2024-03-12 NOTE — PATIENT INSTRUCTIONS
No prescription change needed     DRY EYE TREATMENT    I recommend using artificial tears for your dry eye. There are over the counter drops that work well and may be used up to 4x daily. ( systane balance, complete or ultra, blink, refresh optive, soothe xp, theratears) stay away from any red eye relief products such as visine and clear eyes. Lumify can help with redness used once daily.     If you need more than 4 drops daily, use a preservative free product which come in individual vials and may be used for up to 24 hours and then discarded.   The more severe the dry eye, the more frequent instillation of artificial tears that are needed to reduce irritation/ inflammation. (Sometimes every 1-2 hours for a couple of days).  You can also add a lubricating ointment in the lower lid at bedtime. ( over the counter refresh pm, genteal gel, lacrilube etc.)    Artificial tears work best as a preventative and not as well after your eyes are starting to bother you and/ or are red.  It may take 4- 6 weeks of using the drops before you notice improvement.  If after that time you are still having problems schedule an appointment for an evaluation to discuss different treatments.    Dry eyes are a chronic condition and you may have more symptoms at certain times of the year.      Additional recommended treatment:  Warm compresses once to twice daily for 5-10 minutes  Directions for warm soaks  There are few methods for hot compresses. Moisten a washcloth with hot water, or microwave for 10 seconds, being careful to not get the cloth too hot.   Then put the washcloth onto your eyelids for 5 minutes. It will cool quickly so a rice pack or eyemask that can be heated and laid on top of the washcloth will help retain the heat.   Lid cleansing    Overabundance of bacterial microorganisms along the eyelashes and lid margins induce stress on the tear film and promote inflammation.  Regular lid hygiene helps diminish the bacterial  population to prevent inflammation and infection.  Use a warm compress to loosen any crusts   Cleanse lids once daily with a lid cleansing product as directed such as Ocusoft or Sterilid which can be purchased at most pharmacies. Diluted baby shampoo will also work, but not as well as it dries the skin and can irritate eyelids.  Hypo chlor spray may also be used on closed lids.    Blink regularly  Stay hydrated  Increase humidity  Wear sunglasses   No smoking/ vaping   Replace cosmetics every few months and remove daily  Avoid direct exposure to forced air--turn air vents in the car away and keep fans from blowing directly on your face

## 2024-03-12 NOTE — PROGRESS NOTES
Chief Complaint   Patient presents with    Annual Eye Exam        Last Eye Exam: 2 years ago   Dilated Previously: Yes, side effects of dilation explained today    What are you currently using to see?  glasses       Distance Vision Acuity: Satisfied with vision    Near Vision Acuity: Satisfied with vision while reading and using computer with glasses    Eye Comfort: dry  Do you use eye drops? : Yes: Systane twice daily and warm compresses every day      Natalie Morfin - Optometric Assistant           Medical, surgical and family histories reviewed and updated 3/12/2024.     CE   OBJECTIVE: See Ophthalmology exam    ASSESSMENT:    ICD-10-CM    1. Hyperopia of both eyes with astigmatism  H52.03 REFRACTION    H52.203       2. Encounter for Medicare annual wellness exam  Z00.00 Adult Eye  Referral     EYE EXAM (SIMPLE-NONBILLABLE)      3. Encounter for examination of vision  Z01.00 Adult Eye  Referral     EYE EXAM (SIMPLE-NONBILLABLE)      4. Pseudophakia - Right Eye  Z96.1       5. Nuclear sclerosis of left eye  H25.12 EYE EXAM (SIMPLE-NONBILLABLE)      6. Dry eye  H04.129           PLAN:   Monitor cataract   Warm compresses/ artificial tears    Kandi Anderson OD

## 2024-03-12 NOTE — LETTER
3/12/2024         RE: Dakota Camejo  [address]  [place] [address]        Dear Colleague,    Thank you for referring your patient, Dakota Camejo, to the [facility]. Please see a copy of my visit note below.    Chief Complaint   Patient presents with     Annual Eye Exam        Last Eye Exam: 2 years ago   Dilated Previously: Yes, side effects of dilation explained today    What are you currently using to see?  glasses       Distance Vision Acuity: Satisfied with vision    Near Vision Acuity: Satisfied with vision while reading and using computer with glasses    Eye Comfort: dry  Do you use eye drops? : Yes: Systane twice daily and warm compresses every day      Natalie Morfin - Optometric Assistant           Medical, surgical and family histories reviewed and updated 3/12/2024.     CE   OBJECTIVE: See Ophthalmology exam    ASSESSMENT:    ICD-10-CM    1. Hyperopia of both eyes with astigmatism  H52.03 REFRACTION    H52.203       2. Encounter for Medicare annual wellness exam  Z00.00 Adult Eye  Referral     EYE EXAM (SIMPLE-NONBILLABLE)      3. Encounter for examination of vision  Z01.00 Adult Eye  Referral     EYE EXAM (SIMPLE-NONBILLABLE)      4. Pseudophakia - Right Eye  Z96.1       5. Nuclear sclerosis of left eye  H25.12 EYE EXAM (SIMPLE-NONBILLABLE)      6. Dry eye  H04.129           PLAN:   Monitor cataract   Warm compresses/ artificial tears    Kandi Anderson OD     Again, thank you for allowing me to participate in the care of your patient.        Sincerely,        Kandi Anderson, OD

## 2024-03-19 ENCOUNTER — VIRTUAL VISIT (OUTPATIENT)
Dept: FAMILY MEDICINE | Facility: CLINIC | Age: 74
End: 2024-03-19
Attending: INTERNAL MEDICINE
Payer: COMMERCIAL

## 2024-03-19 DIAGNOSIS — E78.5 DYSLIPIDEMIA: Primary | ICD-10-CM

## 2024-03-19 DIAGNOSIS — D47.2 MONOCLONAL GAMMOPATHY: ICD-10-CM

## 2024-03-19 DIAGNOSIS — M80.00XD OSTEOPOROSIS WITH CURRENT PATHOLOGICAL FRACTURE WITH ROUTINE HEALING, UNSPECIFIED OSTEOPOROSIS TYPE, SUBSEQUENT ENCOUNTER: ICD-10-CM

## 2024-03-19 DIAGNOSIS — C50.911 MALIGNANT NEOPLASM OF RIGHT FEMALE BREAST, UNSPECIFIED ESTROGEN RECEPTOR STATUS, UNSPECIFIED SITE OF BREAST (H): ICD-10-CM

## 2024-03-19 DIAGNOSIS — Z85.828 HISTORY OF BASAL CELL CARCINOMA: ICD-10-CM

## 2024-03-19 PROBLEM — I34.0 MITRAL REGURGITATION: Status: ACTIVE | Noted: 2018-08-22

## 2024-03-19 PROBLEM — Z17.0 MALIGNANT NEOPLASM OF CENTRAL PORTION OF RIGHT BREAST IN FEMALE, ESTROGEN RECEPTOR POSITIVE (H): Status: ACTIVE | Noted: 2020-12-15

## 2024-03-19 PROBLEM — C50.111 MALIGNANT NEOPLASM OF CENTRAL PORTION OF RIGHT BREAST IN FEMALE, ESTROGEN RECEPTOR POSITIVE (H): Status: ACTIVE | Noted: 2020-12-15

## 2024-03-19 PROBLEM — E78.00 HYPERCHOLESTEREMIA: Status: ACTIVE | Noted: 2022-09-22

## 2024-03-19 PROBLEM — F43.10 PTSD (POST-TRAUMATIC STRESS DISORDER): Status: ACTIVE | Noted: 2019-09-06

## 2024-03-19 PROBLEM — M81.0 OSTEOPOROSIS: Status: ACTIVE | Noted: 2017-07-26

## 2024-03-19 PROCEDURE — 99214 OFFICE O/P EST MOD 30 MIN: CPT | Mod: 95 | Performed by: INTERNAL MEDICINE

## 2024-03-19 RX ORDER — CHLORAL HYDRATE 500 MG
1000 CAPSULE ORAL
COMMUNITY

## 2024-03-19 RX ORDER — MULTIVITAMIN
1 TABLET ORAL DAILY
COMMUNITY

## 2024-03-19 RX ORDER — IBUPROFEN 200 MG
500 CAPSULE ORAL
COMMUNITY

## 2024-03-19 NOTE — PROGRESS NOTES
Dakota is a 74 year old who is being evaluated via a billable video visit.    How would you like to obtain your AVS? MyChart  If the video visit is dropped, the invitation should be resent by: Text to cell phone: [phone]  Will anyone else be joining your video visit? No        Assessment and Plan  1. Dyslipidemia  Chronic stable, recent LDL was in 120s at goal given patient's age.  Continue current diet and lifestyle modifications, no lipid panel labs needed at this time.  Can recheck at upcoming annual wellness visit.    2. Malignant neoplasm of right female breast, unspecified estrogen receptor status, unspecified site of breast (H)  Chronic stable, continue current Arimidex as managed by oncology.  -Does have history of stage I invasive ductal carcinoma of the right breast status postlumpectomy with sentinel node biopsy T1b N0 M0 Diagnosed in October 2020 S/P radiation. Started adjuvant Arimidex in December 2020    3. Osteoporosis with current pathological fracture with routine healing, unspecified osteoporosis type, subsequent encounter  Chronic stable, recent DEXA scan reviewed showing Osteopenia though patient states that she had a recent fall injury with right wrist fracture and healed well at this time with no acute concerns.  To continue current alendronate.  - Basic metabolic panel  (Ca, Cl, CO2, Creat, Gluc, K, Na, BUN); Future    4. Monoclonal gammopathy  Chronic problem, reviewed the recent oncology note which does states surveillance lab work as managed by hematology oncology.    5. History of basal cell carcinoma  Patient has some nonspecific multiple skin lesions of different size or shape as she endorses on this video visit with me, no obvious acute needs of treatment per my review exam at this time.  Shared decision for dermatology referral given the risk factors of history of basal cell cancer in the past.  - Adult Dermatology  Referral; Future         Please note that this note  consists of symbols derived from keyboarding, dictation and/or voice recognition software. As a result, there may be errors in the script that have gone undetected. Please consider this when interpreting information found in this chart.    Patient Instructions   As discussed , will continue current medications  Will check renal function.     Referral dermatology placed.   Return in about 6 months (around 9/19/2024), or if symptoms worsen or fail to improve, for Annual Wellness Exam.    MD LEONEL Miller [facility] [place] is a 74 year old, presenting for the following health issues:  Follow Up        3/19/2024    12:55 PM   Additional Questions   Roomed by Francisca Pedraza Start Time:  Joined the call at 3/19/2024, 1:35:54 pm.    History of Present Illness       Reason for visit:  6 month check-in    She eats 4 or more servings of fruits and vegetables daily.She consumes 0 sweetened beverage(s) daily.She exercises with enough effort to increase her heart rate 30 to 60 minutes per day.  She exercises with enough effort to increase her heart rate 7 days per week.   She is taking medications regularly.       Pt states she needs to see a dermatologist- would like a referral.        Last seen patient in September 2023 for annual physical at that time, she is here for 6 months follow-up.      Lab work done at that time showing dyslipidemia with , hypervitaminosis D as well as borderline elevated glucose at that time.  All other workup including mammogram, given the risk factors of breast cancer she was given referral to heme oncology for further recommendations.      Reviewed the recent oncology visit note on October 2023 with Carlos, who is managing her Arimidex.    Allergies   Allergen Reactions    Amoxicillin Swelling    Sulfa Antibiotics      rash    Amoxicillin-Pot Clavulanate Rash    Erythromycin Swelling     hives        History reviewed. No pertinent past  medical history.    Past Surgical History:   Procedure Laterality Date    CATARACT IOL, RT/LT Right        Family History   Problem Relation Age of Onset    Glaucoma Other     Macular Degeneration No family hx of        Social History     Tobacco Use    Smoking status: Former     Types: Cigarettes    Smokeless tobacco: Never    Tobacco comments:     Started smoking at 21 yrs age and stopped 26 yrs age .      3-4 Cig / day .    Substance Use Topics    Alcohol use: Not on file        Current Outpatient Medications   Medication    alendronate (FOSAMAX) 70 MG tablet    anastrozole (ARIMIDEX) 1 MG tablet    Aspirin (BUFFERIN LOW DOSE PO)    calcium carbonate 500 mg, elemental, (OSCAL 500) 1250 (500 Ca) MG TABS tablet    Cholecalciferol 10 MCG (400 UNIT) CAPS    fish oil-omega-3 fatty acids 1000 MG capsule    multivitamin w/minerals (MULTI-VITAMIN) tablet     No current facility-administered medications for this visit.        Review of Systems  Constitutional, HEENT, cardiovascular, pulmonary, GI, , musculoskeletal, neuro, skin, endocrine and psych systems are negative, except as otherwise noted.      Objective           Vitals:  No vitals were obtained today due to virtual visit.    Physical Exam   GENERAL: alert and no distress  EYES: Eyes grossly normal to inspection.  No discharge or erythema, or obvious scleral/conjunctival abnormalities.  RESP: No audible wheeze, cough, or visible cyanosis.    SKIN: Visible skin clear. No significant rash, abnormal pigmentation or lesions.  NEURO: Cranial nerves grossly intact.  Mentation and speech appropriate for age.  PSYCH: Appropriate affect, tone, and pace of words      Video-Visit Details    Type of service:  Video Visit   Video End Time: Left the call at 3/19/2024, 1:50:17 pm.  Originating Location (pt. Location): Home    Distant Location (provider location):  On-site  Platform used for Video Visit: Christ  Signed Electronically by: Andressa Khanna MD

## 2024-03-19 NOTE — PATIENT INSTRUCTIONS
As discussed , will continue current medications  Will check renal function.     Referral dermatology placed.

## 2024-03-26 ENCOUNTER — LAB (OUTPATIENT)
Dept: LAB | Facility: CLINIC | Age: 74
End: 2024-03-26
Payer: COMMERCIAL

## 2024-03-26 DIAGNOSIS — D47.2 MGUS (MONOCLONAL GAMMOPATHY OF UNKNOWN SIGNIFICANCE): ICD-10-CM

## 2024-03-26 DIAGNOSIS — C50.911 MALIGNANT NEOPLASM OF RIGHT FEMALE BREAST, UNSPECIFIED ESTROGEN RECEPTOR STATUS, UNSPECIFIED SITE OF BREAST (H): ICD-10-CM

## 2024-03-26 DIAGNOSIS — M80.00XD OSTEOPOROSIS WITH CURRENT PATHOLOGICAL FRACTURE WITH ROUTINE HEALING, UNSPECIFIED OSTEOPOROSIS TYPE, SUBSEQUENT ENCOUNTER: ICD-10-CM

## 2024-03-26 PROCEDURE — 86300 IMMUNOASSAY TUMOR CA 15-3: CPT

## 2024-03-26 PROCEDURE — 83521 IG LIGHT CHAINS FREE EACH: CPT

## 2024-03-26 PROCEDURE — 84155 ASSAY OF PROTEIN SERUM: CPT

## 2024-03-26 PROCEDURE — 84165 PROTEIN E-PHORESIS SERUM: CPT | Performed by: PATHOLOGY

## 2024-03-26 PROCEDURE — 82378 CARCINOEMBRYONIC ANTIGEN: CPT

## 2024-03-26 PROCEDURE — 80048 BASIC METABOLIC PNL TOTAL CA: CPT

## 2024-03-26 PROCEDURE — 36415 COLL VENOUS BLD VENIPUNCTURE: CPT

## 2024-03-27 LAB
ALBUMIN SERPL ELPH-MCNC: 4.3 G/DL (ref 3.7–5.1)
ALPHA1 GLOB SERPL ELPH-MCNC: 0.3 G/DL (ref 0.2–0.4)
ALPHA2 GLOB SERPL ELPH-MCNC: 0.7 G/DL (ref 0.5–0.9)
ANION GAP SERPL CALCULATED.3IONS-SCNC: 13 MMOL/L (ref 7–15)
B-GLOBULIN SERPL ELPH-MCNC: 0.7 G/DL (ref 0.6–1)
BUN SERPL-MCNC: 14 MG/DL (ref 8–23)
CALCIUM SERPL-MCNC: 10.4 MG/DL (ref 8.8–10.2)
CANCER AG15-3 SERPL-ACNC: 16 U/ML
CEA SERPL-MCNC: 1.9 NG/ML
CHLORIDE SERPL-SCNC: 101 MMOL/L (ref 98–107)
CREAT SERPL-MCNC: 0.69 MG/DL (ref 0.51–0.95)
DEPRECATED HCO3 PLAS-SCNC: 24 MMOL/L (ref 22–29)
EGFRCR SERPLBLD CKD-EPI 2021: >90 ML/MIN/1.73M2
GAMMA GLOB SERPL ELPH-MCNC: 0.9 G/DL (ref 0.7–1.6)
GLUCOSE SERPL-MCNC: 89 MG/DL (ref 70–99)
KAPPA LC FREE SER-MCNC: 1.92 MG/DL (ref 0.33–1.94)
KAPPA LC FREE/LAMBDA FREE SER NEPH: 0.85 {RATIO} (ref 0.26–1.65)
LAMBDA LC FREE SERPL-MCNC: 2.25 MG/DL (ref 0.57–2.63)
LOCATION OF TASK: ABNORMAL
M PROTEIN SERPL ELPH-MCNC: 0.1 G/DL
POTASSIUM SERPL-SCNC: 4 MMOL/L (ref 3.4–5.3)
PROT PATTERN SERPL ELPH-IMP: ABNORMAL
SODIUM SERPL-SCNC: 138 MMOL/L (ref 135–145)
TOTAL PROTEIN SERUM FOR ELP: 6.9 G/DL (ref 6.4–8.3)

## 2024-04-02 ENCOUNTER — ONCOLOGY VISIT (OUTPATIENT)
Dept: ONCOLOGY | Facility: CLINIC | Age: 74
End: 2024-04-02
Attending: INTERNAL MEDICINE
Payer: COMMERCIAL

## 2024-04-02 VITALS
BODY MASS INDEX: 23.13 KG/M2 | HEART RATE: 73 BPM | SYSTOLIC BLOOD PRESSURE: 122 MMHG | DIASTOLIC BLOOD PRESSURE: 76 MMHG | HEIGHT: 60 IN | WEIGHT: 117.8 LBS | OXYGEN SATURATION: 100 % | RESPIRATION RATE: 16 BRPM

## 2024-04-02 DIAGNOSIS — Z00.00 ENCOUNTER FOR MEDICARE ANNUAL WELLNESS EXAM: ICD-10-CM

## 2024-04-02 DIAGNOSIS — Z79.811 LONG TERM (CURRENT) USE OF AROMATASE INHIBITORS: ICD-10-CM

## 2024-04-02 DIAGNOSIS — D47.2 MGUS (MONOCLONAL GAMMOPATHY OF UNKNOWN SIGNIFICANCE): Primary | ICD-10-CM

## 2024-04-02 DIAGNOSIS — C50.911 MALIGNANT NEOPLASM OF RIGHT FEMALE BREAST, UNSPECIFIED ESTROGEN RECEPTOR STATUS, UNSPECIFIED SITE OF BREAST (H): ICD-10-CM

## 2024-04-02 PROCEDURE — 99214 OFFICE O/P EST MOD 30 MIN: CPT | Performed by: INTERNAL MEDICINE

## 2024-04-02 PROCEDURE — G0463 HOSPITAL OUTPT CLINIC VISIT: HCPCS | Performed by: INTERNAL MEDICINE

## 2024-04-02 RX ORDER — ANASTROZOLE 1 MG/1
1 TABLET ORAL DAILY
Qty: 90 TABLET | Refills: 3 | Status: SHIPPED | OUTPATIENT
Start: 2024-04-02

## 2024-04-02 RX ORDER — ALENDRONATE SODIUM 70 MG/1
70 TABLET ORAL
Qty: 12 TABLET | Refills: 3 | Status: SHIPPED | OUTPATIENT
Start: 2024-04-02

## 2024-04-02 ASSESSMENT — PAIN SCALES - GENERAL: PAINLEVEL: NO PAIN (0)

## 2024-04-02 NOTE — PROGRESS NOTES
Oncology Rooming Note    April 2, 2024 1:39 PM   Dakota Camejo is a 74 year old female who presents for:    Chief Complaint   Patient presents with    Oncology Clinic Visit     Malignant neoplasm of central portion of right breast in female, estrogen receptor positive (H)     Initial Vitals: /76   Pulse 73   Resp 16   Ht 1.524 m (5')   Wt 53.4 kg (117 lb 12.8 oz)   SpO2 100%   BMI 23.01 kg/m   Estimated body mass index is 23.01 kg/m  as calculated from the following:    Height as of this encounter: 1.524 m (5').    Weight as of this encounter: 53.4 kg (117 lb 12.8 oz). Body surface area is 1.5 meters squared.  No Pain (0) Comment: Data Unavailable   No LMP recorded. Patient has had a hysterectomy.  Allergies reviewed: Yes  Medications reviewed: Yes    Medications: Medication refills not needed today.  Pharmacy name entered into [employer]: [id number] - [place], [place] - [address] AT [facility] [address] [place] [address]    Frailty Screening:   Is the patient here for a new oncology consult visit in cancer care? 2. No      Clinical concerns: None       Jannie Alicea MA

## 2024-04-02 NOTE — PROGRESS NOTES
[facility]    Hematology/Oncology return patient note      Today's Date: April 2, 2024  Reason for Consult: History of breast cancer here to establish care      HISTORY OF PRESENT ILLNESS: Dakota is a very pleasant 73-year-old female with the following oncologic history  1.  History of stage I invasive ductal carcinoma of the right breast status postlumpectomy with sentinel node biopsy T1b N0 M0, grade 1, ER +90% OH 30% HER2/jacqui FISH nonamplified.  Ki-67 10%.  Oncotype DX score of 20.  Diagnosed in October 2020.    2.  Status post radiation. Started adjuvant Arimidex in December 2020  3.  History of osteoporosis started Fosamax in 2020 January, declined Zometa  4.  History of basal cell carcinoma  5.  History of IgG kappa MGUS diagnosed in November 2022 being followed yearly    Dakota overall feels well.  She had a bone density scan completed in February 2024 which showsA T-score of -2.3 in the right femoral neck.  She lives in [place].  She is doing body pumping sometimes 9 times a week.  Doing great on the medication        REVIEW OF SYSTEMS:   14 point ROS was reviewed and is negative other than as noted above in HPI.       HOME MEDICATIONS:  Current Outpatient Medications   Medication Sig Dispense Refill    alendronate (FOSAMAX) 70 MG tablet Take 1 tablet (70 mg) by mouth every 7 days 90 tablet 3    anastrozole (ARIMIDEX) 1 MG tablet Take 1 tablet (1 mg) by mouth daily 90 tablet 3    Aspirin (BUFFERIN LOW DOSE PO) Take 81 mg by mouth      calcium carbonate 500 mg, elemental, (OSCAL 500) 1250 (500 Ca) MG TABS tablet Take 500 mg by mouth      Cholecalciferol 10 MCG (400 UNIT) CAPS       fish oil-omega-3 fatty acids 1000 MG capsule Take 1,000 mg by mouth      multivitamin w/minerals (MULTI-VITAMIN) tablet Take 1 tablet by mouth daily           ALLERGIES:  Allergies   Allergen Reactions    Amoxicillin Swelling    Sulfa Antibiotics      rash    Amoxicillin-Pot Clavulanate Rash    Erythromycin  Swelling     hives         PAST MEDICAL HISTORY:  Noted and reviewed    PAST SURGICAL HISTORY:  Noted and reviewed      SOCIAL HISTORY:  Social History     Socioeconomic History    Marital status:      Spouse name: Not on file    Number of children: Not on file    Years of education: Not on file    Highest education level: Not on file   Occupational History    Not on file   Tobacco Use    Smoking status: Former     Types: Cigarettes    Smokeless tobacco: Never    Tobacco comments:     Started smoking at 21 yrs age and stopped 26 yrs age .      3-4 Cig / day .    Vaping Use    Vaping Use: Never used   Substance and Sexual Activity    Alcohol use: Not on file    Drug use: Never    Sexual activity: Not on file   Other Topics Concern    Not on file   Social History Narrative    Not on file     Social Determinants of Health     Financial Resource Strain: Low Risk  (2/5/2024)    Financial Resource Strain     Within the past 12 months, have you or your family members you live with been unable to get utilities (heat, electricity) when it was really needed?: No   Food Insecurity: Low Risk  (2/5/2024)    Food Insecurity     Within the past 12 months, did you worry that your food would run out before you got money to buy more?: No     Within the past 12 months, did the food you bought just not last and you didn t have money to get more?: No   Transportation Needs: Low Risk  (2/5/2024)    Transportation Needs     Within the past 12 months, has lack of transportation kept you from medical appointments, getting your medicines, non-medical meetings or appointments, work, or from getting things that you need?: No   Physical Activity: Not on file   Stress: Not on file   Social Connections: Not on file   Interpersonal Safety: Low Risk  (10/3/2023)    Interpersonal Safety     Do you feel physically and emotionally safe where you currently live?: Yes     Within the past 12 months, have you been hit, slapped, kicked or otherwise  physically hurt by someone?: No     Within the past 12 months, have you been humiliated or emotionally abused in other ways by your partner or ex-partner?: No   Housing Stability: Low Risk  (2024)    Housing Stability     Do you have housing? : Yes     Are you worried about losing your housing?: No         FAMILY HISTORY:  Family History   Problem Relation Age of Onset    Glaucoma Other     Macular Degeneration No family hx of          PHYSICAL EXAM:  Vital signs:  /76   Pulse 73   Resp 16   Ht 1.524 m (5')   Wt 53.4 kg (117 lb 12.8 oz)   SpO2 100%   BMI 23.01 kg/m     ECO  GENERAL/CONSTITUTIONAL: No acute distress.  EYES: Pupils are equal, round, and react to light and accommodation. Extraocular movements intact.  No scleral icterus.  ENT/MOUTH: Neck supple. Oropharynx clear, no mucositis.  LYMPH: No anterior cervical, posterior cervical, supraclavicular, axillary or inguinal adenopathy.   RESPIRATORY: Clear to auscultation bilaterally. No crackles or wheezing.   CARDIOVASCULAR: Regular rate and rhythm without murmurs, gallops, or rubs.  GASTROINTESTINAL: No hepatosplenomegaly, masses, or tenderness. The patient has normal bowel sounds. No guarding.  No distention.  MUSCULOSKELETAL: Warm and well-perfused, no cyanosis, clubbing, or edema.  NEUROLOGIC: Cranial nerves II-XII are intact. Alert, oriented, answers questions appropriately.  INTEGUMENTARY: No rashes or jaundice.  GAIT: Steady, does not use assistive device  Bilateral breast exam is normal.  No evidence of recurrence    LABS:  Noted and stable . Reviewed with the patient     PATHOLOGY:  Noted as per HPI    IMAGING:  Noted as  per HPI    ASSESSMENT/PLAN:  Dakota Camejo is a 74 year old female with history of breast cancer and MGUS      1 Breast cancer  On arimidex   Mammogram 10/2024 ordered   She can consider 7 years of antiestrogen therapy however I would recommend 5 years should be sufficient      2 bone health  on fosamax since  2020, crudely reviewed and looks improved . Should stop fosamax in 2025 december Declined zometa   Update dexa     3 labs and md visit in 6 months     Total time spent on day of visit, including review of tests, obtaining/reviewing separately obtained history, ordering medications/tests/procedures, communicating with PCP/consultants, and documenting in electronic medical record: 30  minutes       Richie Harvey MD  Hematology/Oncology  [facility]

## 2024-04-02 NOTE — LETTER
4/2/2024         RE: Dakota Camejo  [address]  [place] [address]        Dear Colleague,    Thank you for referring your patient, Dakota Camejo, to the [facility]. Please see a copy of my visit note below.    Oncology Rooming Note    April 2, 2024 1:39 PM   Dakota Camejo is a 74 year old female who presents for:    Chief Complaint   Patient presents with     Oncology Clinic Visit     Malignant neoplasm of central portion of right breast in female, estrogen receptor positive (H)     Initial Vitals: /76   Pulse 73   Resp 16   Ht 1.524 m (5')   Wt 53.4 kg (117 lb 12.8 oz)   SpO2 100%   BMI 23.01 kg/m   Estimated body mass index is 23.01 kg/m  as calculated from the following:    Height as of this encounter: 1.524 m (5').    Weight as of this encounter: 53.4 kg (117 lb 12.8 oz). Body surface area is 1.5 meters squared.  No Pain (0) Comment: Data Unavailable   No LMP recorded. Patient has had a hysterectomy.  Allergies reviewed: Yes  Medications reviewed: Yes    Medications: Medication refills not needed today.  Pharmacy name entered into [employer]: [id number] - [place], [place] - [address] AT [facility] [address] [place] [address]    Frailty Screening:   Is the patient here for a new oncology consult visit in cancer care? 2. No      Clinical concerns: None       Jannie Alicea MA              [facility]    Hematology/Oncology return patient note      Today's Date: April 2, 2024  Reason for Consult: History of breast cancer here to establish care      HISTORY OF PRESENT ILLNESS: Dakota is a very pleasant 73-year-old female with the following oncologic history  1.  History of stage I invasive ductal carcinoma of the right breast status postlumpectomy with sentinel node biopsy T1b N0 M0, grade 1, ER +90% CT 30% HER2/jacqui FISH nonamplified.  Ki-67 10%.  Oncotype DX score of 20.  Diagnosed in October 2020.    2.  Status post radiation.  Started adjuvant Arimidex in December 2020  3.  History of osteoporosis started Fosamax in 2020 January, declined Zometa  4.  History of basal cell carcinoma  5.  History of IgG kappa MGUS diagnosed in November 2022 being followed yearly    Dakota overall feels well.  She had a bone density scan completed in February 2024 which showsA T-score of -2.3 in the right femoral neck.  She lives in [place].  She is doing body pumping sometimes 9 times a week.  Doing great on the medication        REVIEW OF SYSTEMS:   14 point ROS was reviewed and is negative other than as noted above in HPI.       HOME MEDICATIONS:  Current Outpatient Medications   Medication Sig Dispense Refill     alendronate (FOSAMAX) 70 MG tablet Take 1 tablet (70 mg) by mouth every 7 days 90 tablet 3     anastrozole (ARIMIDEX) 1 MG tablet Take 1 tablet (1 mg) by mouth daily 90 tablet 3     Aspirin (BUFFERIN LOW DOSE PO) Take 81 mg by mouth       calcium carbonate 500 mg, elemental, (OSCAL 500) 1250 (500 Ca) MG TABS tablet Take 500 mg by mouth       Cholecalciferol 10 MCG (400 UNIT) CAPS        fish oil-omega-3 fatty acids 1000 MG capsule Take 1,000 mg by mouth       multivitamin w/minerals (MULTI-VITAMIN) tablet Take 1 tablet by mouth daily           ALLERGIES:  Allergies   Allergen Reactions     Amoxicillin Swelling     Sulfa Antibiotics      rash     Amoxicillin-Pot Clavulanate Rash     Erythromycin Swelling     hives         PAST MEDICAL HISTORY:  Noted and reviewed    PAST SURGICAL HISTORY:  Noted and reviewed      SOCIAL HISTORY:  Social History     Socioeconomic History     Marital status:      Spouse name: Not on file     Number of children: Not on file     Years of education: Not on file     Highest education level: Not on file   Occupational History     Not on file   Tobacco Use     Smoking status: Former     Types: Cigarettes     Smokeless tobacco: Never     Tobacco comments:     Started smoking at 21 yrs age and stopped 26 yrs age  .      3-4 Cig / day .    Vaping Use     Vaping Use: Never used   Substance and Sexual Activity     Alcohol use: Not on file     Drug use: Never     Sexual activity: Not on file   Other Topics Concern     Not on file   Social History Narrative     Not on file     Social Determinants of Health     Financial Resource Strain: Low Risk  (2/5/2024)    Financial Resource Strain      Within the past 12 months, have you or your family members you live with been unable to get utilities (heat, electricity) when it was really needed?: No   Food Insecurity: Low Risk  (2/5/2024)    Food Insecurity      Within the past 12 months, did you worry that your food would run out before you got money to buy more?: No      Within the past 12 months, did the food you bought just not last and you didn t have money to get more?: No   Transportation Needs: Low Risk  (2/5/2024)    Transportation Needs      Within the past 12 months, has lack of transportation kept you from medical appointments, getting your medicines, non-medical meetings or appointments, work, or from getting things that you need?: No   Physical Activity: Not on file   Stress: Not on file   Social Connections: Not on file   Interpersonal Safety: Low Risk  (10/3/2023)    Interpersonal Safety      Do you feel physically and emotionally safe where you currently live?: Yes      Within the past 12 months, have you been hit, slapped, kicked or otherwise physically hurt by someone?: No      Within the past 12 months, have you been humiliated or emotionally abused in other ways by your partner or ex-partner?: No   Housing Stability: Low Risk  (2/5/2024)    Housing Stability      Do you have housing? : Yes      Are you worried about losing your housing?: No         FAMILY HISTORY:  Family History   Problem Relation Age of Onset     Glaucoma Other      Macular Degeneration No family hx of          PHYSICAL EXAM:  Vital signs:  /76   Pulse 73   Resp 16   Ht 1.524 m (5')   Wt  53.4 kg (117 lb 12.8 oz)   SpO2 100%   BMI 23.01 kg/m     ECO  GENERAL/CONSTITUTIONAL: No acute distress.  EYES: Pupils are equal, round, and react to light and accommodation. Extraocular movements intact.  No scleral icterus.  ENT/MOUTH: Neck supple. Oropharynx clear, no mucositis.  LYMPH: No anterior cervical, posterior cervical, supraclavicular, axillary or inguinal adenopathy.   RESPIRATORY: Clear to auscultation bilaterally. No crackles or wheezing.   CARDIOVASCULAR: Regular rate and rhythm without murmurs, gallops, or rubs.  GASTROINTESTINAL: No hepatosplenomegaly, masses, or tenderness. The patient has normal bowel sounds. No guarding.  No distention.  MUSCULOSKELETAL: Warm and well-perfused, no cyanosis, clubbing, or edema.  NEUROLOGIC: Cranial nerves II-XII are intact. Alert, oriented, answers questions appropriately.  INTEGUMENTARY: No rashes or jaundice.  GAIT: Steady, does not use assistive device  Bilateral breast exam is normal.  No evidence of recurrence    LABS:  Noted and stable . Reviewed with the patient     PATHOLOGY:  Noted as per HPI    IMAGING:  Noted as  per HPI    ASSESSMENT/PLAN:  Dakota Camejo is a 74 year old female with history of breast cancer and MGUS      1 Breast cancer  On arimidex   Mammogram 10/2024 ordered   She can consider 7 years of antiestrogen therapy however I would recommend 5 years should be sufficient      2 bone health  on fosamax since , crudely reviewed and looks improved . Should stop fosamax in 2025  Declined zometa   Update dexa     3 labs and md visit in 6 months     Total time spent on day of visit, including review of tests, obtaining/reviewing separately obtained history, ordering medications/tests/procedures, communicating with PCP/consultants, and documenting in electronic medical record: 30  minutes       Richie Harvey MD  Hematology/Oncology  [facility]       Again, thank you for allowing me to participate in the  care of your patient.        Sincerely,        Richie Harvey MD

## 2024-09-19 ENCOUNTER — OFFICE VISIT (OUTPATIENT)
Dept: FAMILY MEDICINE | Facility: CLINIC | Age: 74
End: 2024-09-19
Attending: INTERNAL MEDICINE
Payer: COMMERCIAL

## 2024-09-19 VITALS
HEART RATE: 64 BPM | BODY MASS INDEX: 22.85 KG/M2 | OXYGEN SATURATION: 96 % | TEMPERATURE: 99.1 F | SYSTOLIC BLOOD PRESSURE: 102 MMHG | WEIGHT: 116.4 LBS | HEIGHT: 60 IN | DIASTOLIC BLOOD PRESSURE: 65 MMHG | RESPIRATION RATE: 16 BRPM

## 2024-09-19 DIAGNOSIS — M85.80 AGE-RELATED BONE LOSS: ICD-10-CM

## 2024-09-19 DIAGNOSIS — Z00.00 ENCOUNTER FOR MEDICARE ANNUAL WELLNESS EXAM: Primary | ICD-10-CM

## 2024-09-19 DIAGNOSIS — Z23 NEED FOR VACCINATION: ICD-10-CM

## 2024-09-19 DIAGNOSIS — S52.501A CLOSED FRACTURE OF DISTAL END OF RIGHT RADIUS, UNSPECIFIED FRACTURE MORPHOLOGY, INITIAL ENCOUNTER: ICD-10-CM

## 2024-09-19 DIAGNOSIS — Z79.811 LONG TERM CURRENT USE OF AROMATASE INHIBITOR: ICD-10-CM

## 2024-09-19 DIAGNOSIS — Z17.0 MALIGNANT NEOPLASM OF CENTRAL PORTION OF RIGHT BREAST IN FEMALE, ESTROGEN RECEPTOR POSITIVE (H): ICD-10-CM

## 2024-09-19 DIAGNOSIS — S69.91XA WRIST INJURY, RIGHT, INITIAL ENCOUNTER: ICD-10-CM

## 2024-09-19 DIAGNOSIS — M81.0 AGE RELATED OSTEOPOROSIS, UNSPECIFIED PATHOLOGICAL FRACTURE PRESENCE: ICD-10-CM

## 2024-09-19 DIAGNOSIS — M80.00XD OSTEOPOROSIS WITH CURRENT PATHOLOGICAL FRACTURE WITH ROUTINE HEALING, UNSPECIFIED OSTEOPOROSIS TYPE, SUBSEQUENT ENCOUNTER: ICD-10-CM

## 2024-09-19 DIAGNOSIS — C50.111 MALIGNANT NEOPLASM OF CENTRAL PORTION OF RIGHT BREAST IN FEMALE, ESTROGEN RECEPTOR POSITIVE (H): ICD-10-CM

## 2024-09-19 DIAGNOSIS — E78.5 DYSLIPIDEMIA: ICD-10-CM

## 2024-09-19 DIAGNOSIS — D47.2 MONOCLONAL GAMMOPATHY: ICD-10-CM

## 2024-09-19 DIAGNOSIS — L82.1 SEBORRHEIC KERATOSES: ICD-10-CM

## 2024-09-19 PROCEDURE — 99214 OFFICE O/P EST MOD 30 MIN: CPT | Mod: 25 | Performed by: INTERNAL MEDICINE

## 2024-09-19 PROCEDURE — 90662 IIV NO PRSV INCREASED AG IM: CPT | Performed by: INTERNAL MEDICINE

## 2024-09-19 PROCEDURE — 91320 SARSCV2 VAC 30MCG TRS-SUC IM: CPT | Performed by: INTERNAL MEDICINE

## 2024-09-19 PROCEDURE — G0439 PPPS, SUBSEQ VISIT: HCPCS | Performed by: INTERNAL MEDICINE

## 2024-09-19 PROCEDURE — G0008 ADMIN INFLUENZA VIRUS VAC: HCPCS | Performed by: INTERNAL MEDICINE

## 2024-09-19 PROCEDURE — 90480 ADMN SARSCOV2 VAC 1/ONLY CMP: CPT | Performed by: INTERNAL MEDICINE

## 2024-09-19 RX ORDER — ALENDRONATE SODIUM 10 MG/1
TABLET ORAL
COMMUNITY
End: 2024-09-19

## 2024-09-19 ASSESSMENT — PAIN SCALES - GENERAL: PAINLEVEL: NO PAIN (0)

## 2024-09-19 NOTE — PATIENT INSTRUCTIONS
As discussed, please do fasting LAB only appointment     Will take care of the required labs only at this time as all other labs taken care by Oncology.     ====================================    Patient Education   Preventive Care Advice   This is general advice given by our system to help you stay healthy. However, your care team may have specific advice just for you. Please talk to your care team about your preventive care needs.  Nutrition  Eat 5 or more servings of fruits and vegetables each day.  Try wheat bread, brown rice and whole grain pasta (instead of white bread, rice, and pasta).  Get enough calcium and vitamin D. Check the label on foods and aim for 100% of the RDA (recommended daily allowance).  Lifestyle  Exercise at least 150 minutes each week  (30 minutes a day, 5 days a week).  Do muscle strengthening activities 2 days a week. These help control your weight and prevent disease.  No smoking.  Wear sunscreen to prevent skin cancer.  Have a dental exam and cleaning every 6 months.  Yearly exams  See your health care team every year to talk about:  Any changes in your health.  Any medicines your care team has prescribed.  Preventive care, family planning, and ways to prevent chronic diseases.  Shots (vaccines)   HPV shots (up to age 26), if you've never had them before.  Hepatitis B shots (up to age 59), if you've never had them before.  COVID-19 shot: Get this shot when it's due.  Flu shot: Get a flu shot every year.  Tetanus shot: Get a tetanus shot every 10 years.  Pneumococcal, hepatitis A, and RSV shots: Ask your care team if you need these based on your risk.  Shingles shot (for age 50 and up)  General health tests  Diabetes screening:  Starting at age 35, Get screened for diabetes at least every 3 years.  If you are younger than age 35, ask your care team if you should be screened for diabetes.  Cholesterol test: At age 39, start having a cholesterol test every 5 years, or more often if  advised.  Bone density scan (DEXA): At age 50, ask your care team if you should have this scan for osteoporosis (brittle bones).  Hepatitis C: Get tested at least once in your life.  STIs (sexually transmitted infections)  Before age 24: Ask your care team if you should be screened for STIs.  After age 24: Get screened for STIs if you're at risk. You are at risk for STIs (including HIV) if:  You are sexually active with more than one person.  You don't use condoms every time.  You or a partner was diagnosed with a sexually transmitted infection.  If you are at risk for HIV, ask about PrEP medicine to prevent HIV.  Get tested for HIV at least once in your life, whether you are at risk for HIV or not.  Cancer screening tests  Cervical cancer screening: If you have a cervix, begin getting regular cervical cancer screening tests starting at age 21.  Breast cancer scan (mammogram): If you've ever had breasts, begin having regular mammograms starting at age 40. This is a scan to check for breast cancer.  Colon cancer screening: It is important to start screening for colon cancer at age 45.  Have a colonoscopy test every 10 years (or more often if you're at risk) Or, ask your provider about stool tests like a FIT test every year or Cologuard test every 3 years.  To learn more about your testing options, visit:   .  For help making a decision, visit:   https://bit.ly/fj06028.  Prostate cancer screening test: If you have a prostate, ask your care team if a prostate cancer screening test (PSA) at age 55 is right for you.  Lung cancer screening: If you are a current or former smoker ages 50 to 80, ask your care team if ongoing lung cancer screenings are right for you.  For informational purposes only. Not to replace the advice of your health care provider. Copyright   2023 [employer]. All rights reserved. Clinically reviewed by the [facility] Transitions Program. [employer] [id number] - REV 01/24.

## 2024-09-19 NOTE — PROGRESS NOTES
Preventive Care Visit  [facility] JENNIFER Khanna MD, Internal Medicine  Sep 19, 2024        Assessment and Plan  1. Encounter for Medicare annual wellness exam  Last sen pt on VV of 3/2024 for dyslipidemia and Rt breast cancer risk factors on Arimidex as managed by oncology as managed by Oncology.      - PRIMARY CARE FOLLOW-UP SCHEDULING  - INFLUENZA HIGH DOSE, TRIVALENT, PF (FLUZONE)  - COVID-19 12+ (PFIZER)  - REVIEW OF HEALTH MAINTENANCE PROTOCOL ORDERS  - Lipid panel reflex to direct LDL Non-fasting; Future  - Vitamin D deficiency screening; Future  - PRIMARY CARE FOLLOW-UP SCHEDULING; Future    2. Malignant neoplasm of central portion of right breast in female, estrogen receptor positive (H)  3. Long term current use of aromatase inhibitor  4. Monoclonal gammopathy  Chronic stable, -Does have history of stage I invasive ductal carcinoma of the right breast status postlumpectomy with sentinel node biopsy T1b N0 M0 Diagnosed in October 2020 S/P radiation. Continue current recommendations of Hemoncology.     5. Osteoporosis with current pathological fracture with routine healing, unspecified osteoporosis type, subsequent encounter  6. Wrist injury, right, initial encounter  7. Closed fracture of distal end of right radius, unspecified fracture morphology, initial encounter  Chronic stable, pt following Fosamax.   - Vitamin D deficiency screening; Future    8. Age related osteoporosis, unspecified pathological fracture presence  10. Age-related bone loss  - Vitamin D deficiency screening; Future    9. Dyslipidemia  - Lipid panel reflex to direct LDL Non-fasting; Future    11. Need for vaccination  - INFLUENZA HIGH DOSE, TRIVALENT, PF (FLUZONE)  - COVID-19 12+ (PFIZER)    12. Seborrheic keratoses  Single large skin lesion on the back of the trunk which appears as it needs to get removed given pt discomfort with clothing. Does have upcoming appt with dermatology on the referral already  given.       Please note that this note consists of symbols derived from keyboarding, dictation and/or voice recognition software. As a result, there may be errors in the script that have gone undetected. Please consider this when interpreting information found in this chart.    Patient Instructions   As discussed, please do fasting LAB only appointment     Will take care of the required labs only at this time as all other labs taken care by Oncology.     ====================================    Patient Education  Preventive Care Advice   This is general advice given by our system to help you stay healthy. However, your care team may have specific advice just for you. Please talk to your care team about your preventive care needs.  Nutrition  Eat 5 or more servings of fruits and vegetables each day.  Try wheat bread, brown rice and whole grain pasta (instead of white bread, rice, and pasta).  Get enough calcium and vitamin D. Check the label on foods and aim for 100% of the RDA (recommended daily allowance).  Lifestyle  Exercise at least 150 minutes each week  (30 minutes a day, 5 days a week).  Do muscle strengthening activities 2 days a week. These help control your weight and prevent disease.  No smoking.  Wear sunscreen to prevent skin cancer.  Have a dental exam and cleaning every 6 months.  Yearly exams  See your health care team every year to talk about:  Any changes in your health.  Any medicines your care team has prescribed.  Preventive care, family planning, and ways to prevent chronic diseases.  Shots (vaccines)   HPV shots (up to age 26), if you've never had them before.  Hepatitis B shots (up to age 59), if you've never had them before.  COVID-19 shot: Get this shot when it's due.  Flu shot: Get a flu shot every year.  Tetanus shot: Get a tetanus shot every 10 years.  Pneumococcal, hepatitis A, and RSV shots: Ask your care team if you need these based on your risk.  Shingles shot (for age 50 and  up)  General health tests  Diabetes screening:  Starting at age 35, Get screened for diabetes at least every 3 years.  If you are younger than age 35, ask your care team if you should be screened for diabetes.  Cholesterol test: At age 39, start having a cholesterol test every 5 years, or more often if advised.  Bone density scan (DEXA): At age 50, ask your care team if you should have this scan for osteoporosis (brittle bones).  Hepatitis C: Get tested at least once in your life.  STIs (sexually transmitted infections)  Before age 24: Ask your care team if you should be screened for STIs.  After age 24: Get screened for STIs if you're at risk. You are at risk for STIs (including HIV) if:  You are sexually active with more than one person.  You don't use condoms every time.  You or a partner was diagnosed with a sexually transmitted infection.  If you are at risk for HIV, ask about PrEP medicine to prevent HIV.  Get tested for HIV at least once in your life, whether you are at risk for HIV or not.  Cancer screening tests  Cervical cancer screening: If you have a cervix, begin getting regular cervical cancer screening tests starting at age 21.  Breast cancer scan (mammogram): If you've ever had breasts, begin having regular mammograms starting at age 40. This is a scan to check for breast cancer.  Colon cancer screening: It is important to start screening for colon cancer at age 45.  Have a colonoscopy test every 10 years (or more often if you're at risk) Or, ask your provider about stool tests like a FIT test every year or Cologuard test every 3 years.  To learn more about your testing options, visit:   .  For help making a decision, visit:   https://bit.ly/of26634.  Prostate cancer screening test: If you have a prostate, ask your care team if a prostate cancer screening test (PSA) at age 55 is right for you.  Lung cancer screening: If you are a current or former smoker ages 50 to 80, ask your care team if ongoing  lung cancer screenings are right for you.  For informational purposes only. Not to replace the advice of your health care provider. Copyright   2023 [facility]. All rights reserved. Clinically reviewed by the [facility]. [id number] - REV 01/24.     Return in about 1 year (around 9/19/2025), or if symptoms worsen or fail to improve, for If symptoms persist, Follow up of last visit, Annual Wellness Exam.    Andressa Khanna MD  [facility] JENNIFER Duncan is a 74 year old, presenting for the following:  Physical        9/19/2024     1:54 PM   Additional Questions   Roomed by Sharon SCALES         Health Care Directive  Patient does not have a Health Care Directive or Living Will: Patient states has Advance Directive and will bring in a copy to clinic.    HPI        9/14/2024   General Health   How would you rate your overall physical health? Excellent   Feel stress (tense, anxious, or unable to sleep) Not at all            9/14/2024   Nutrition   Diet: Regular (no restrictions)            9/14/2024   Exercise   Days per week of moderate/strenous exercise 7 days   Average minutes spent exercising at this level 60 min            9/14/2024   Social Factors   Frequency of gathering with friends or relatives Three times a week   Worry food won't last until get money to buy more No   Food not last or not have enough money for food? No   Do you have housing? (Housing is defined as stable permanent housing and does not include staying ouside in a car, in a tent, in an abandoned building, in an overnight shelter, or couch-surfing.) Yes   Are you worried about losing your housing? No   Lack of transportation? No   Unable to get utilities (heat,electricity)? No            9/14/2024   Fall Risk   Fallen 2 or more times in the past year? No    No   Trouble with walking or balance? No    No       Multiple values from one day are sorted in  reverse-chronological order          2024   Activities of Daily Living- Home Safety   Needs help with the following daily activites None of the above   Safety concerns in the home None of the above            2024   Dental   Dentist two times every year? Yes            2024   Hearing Screening   Hearing concerns? None of the above            2024   Driving Risk Screening   Patient/family members have concerns about driving No            2024   General Alertness/Fatigue Screening   Have you been more tired than usual lately? No            2024   Urinary Incontinence Screening   Bothered by leaking urine in past 6 months No            2024   TB Screening   Were you born outside of the [place]? No            Today's PHQ-2 Score:       2024     7:32 AM   PHQ-2 (  [employer])   Q1: Little interest or pleasure in doing things 0   Q2: Feeling down, depressed or hopeless 0   PHQ-2 Score 0   Q1: Little interest or pleasure in doing things Not at all   Q2: Feeling down, depressed or hopeless Not at all   PHQ-2 Score 0           2024   Substance Use   Alcohol more than 3/day or more than 7/wk No   Do you have a current opioid prescription? No   How severe/bad is pain from 1 to 10? 0/10 (No Pain)   Do you use any other substances recreationally? No        Social History     Tobacco Use    Smoking status: Former     Current packs/day: 0.00     Average packs/day: 0.5 packs/day for 8.0 years (4.0 ttl pk-yrs)     Types: Cigarettes     Start date: 1971     Quit date: 1975     Years since quittin.7    Smokeless tobacco: Never    Tobacco comments:     Started smoking at 21 yrs age and stopped 26 yrs age .      3-4 Cig / day .    Vaping Use    Vaping status: Never Used   Substance Use Topics    Alcohol use: Yes    Drug use: Never           10/25/2023   LAST FHS-7 RESULTS   1st degree relative breast or ovarian cancer Yes   Any relative bilateral breast cancer No   Any male have  breast cancer No   Any ONE woman have BOTH breast AND ovarian cancer No   Any woman with breast cancer before 50yrs No   2 or more relatives with breast AND/OR ovarian cancer No   2 or more relatives with breast AND/OR bowel cancer No           Mammogram Screening - Annual screen due to greater than 20% lifetime risk as estimated by Breast Cancer Risk Calculator    ASCVD Risk   The 10-year ASCVD risk score (Julia KIM, et al., 2019) is: 8.5%    Values used to calculate the score:      Age: 74 years      Sex: Female      Is Non- : No      Diabetic: No      Tobacco smoker: No      Systolic Blood Pressure: 102 mmHg      Is BP treated: No      HDL Cholesterol: 70 mg/dL      Total Cholesterol: 215 mg/dL      Reviewed and updated as needed this visit by Provider   Tobacco  Allergies  Meds  Problems  Med Hx  Surg Hx  Fam Hx            Past Medical History:   Diagnosis Date    Arthritis     Cancer (H)      Past Surgical History:   Procedure Laterality Date    APPENDECTOMY  2007    BIOPSY  2020    BREAST SURGERY  2020    Lumpectomy    CATARACT IOL, RT/LT Right     COLONOSCOPY  2016    GYN SURGERY  2014    Hysterectomy     OB History   No obstetric history on file.     Lab work is in process  Labs reviewed in EPIC  BP Readings from Last 3 Encounters:   09/19/24 102/65   04/02/24 122/76   02/05/24 109/62    Wt Readings from Last 3 Encounters:   09/19/24 52.8 kg (116 lb 6.4 oz)   04/02/24 53.4 kg (117 lb 12.8 oz)   02/05/24 52.6 kg (116 lb)                  Patient Active Problem List   Diagnosis    Malignant neoplasm of central portion of right breast in female, estrogen receptor positive (H)    History of posttraumatic stress disorder (PTSD)    Hypercholesteremia    Mitral regurgitation    Monoclonal gammopathy    Osteoporosis    PTSD (post-traumatic stress disorder)    Vitreous detachment    History of basal cell carcinoma     Past Surgical History:   Procedure Laterality Date     APPENDECTOMY  2007    BIOPSY      BREAST SURGERY  2020    Lumpectomy    CATARACT IOL, RT/LT Right     COLONOSCOPY  2016    GYN SURGERY  2014    Hysterectomy       Social History     Tobacco Use    Smoking status: Former     Current packs/day: 0.00     Average packs/day: 0.5 packs/day for 8.0 years (4.0 ttl pk-yrs)     Types: Cigarettes     Start date: 1971     Quit date: 1975     Years since quittin.7    Smokeless tobacco: Never    Tobacco comments:     Started smoking at 21 yrs age and stopped 26 yrs age .      3-4 Cig / day .    Substance Use Topics    Alcohol use: Yes     Family History   Problem Relation Age of Onset    Glaucoma Other     Diabetes Maternal Grandmother     Cerebrovascular Disease Maternal Grandmother     Diabetes Brother     Macular Degeneration No family hx of          Current Outpatient Medications   Medication Sig Dispense Refill    alendronate (FOSAMAX) 70 MG tablet Take 1 tablet (70 mg) by mouth every 7 days 12 tablet 3    anastrozole (ARIMIDEX) 1 MG tablet Take 1 tablet (1 mg) by mouth daily 90 tablet 3    Aspirin (BUFFERIN LOW DOSE PO) Take 81 mg by mouth      calcium carbonate 500 mg, elemental, (OSCAL 500) 1250 (500 Ca) MG TABS tablet Take 500 mg by mouth      Cholecalciferol 10 MCG (400 UNIT) CAPS       fish oil-omega-3 fatty acids 1000 MG capsule Take 1,000 mg by mouth      multivitamin w/minerals (MULTI-VITAMIN) tablet Take 1 tablet by mouth daily       Allergies   Allergen Reactions    Amoxicillin Swelling    Sulfa Antibiotics      rash    Amoxicillin-Pot Clavulanate Rash    Erythromycin Swelling     hives    Other Reaction(s): Swelling, lips/tongue     Recent Labs   Lab Test 24  1056 10/06/23  0750   LDL  --  129*   HDL  --  70   TRIG  --  79   ALT  --  10   CR 0.69 0.61   GFRESTIMATED >90 >90   POTASSIUM 4.0 4.2      Current providers sharing in care for this patient include:  Patient Care Team:  Andressa Khanna MD as PCP - General (Internal  "Medicine)  Andressa Khanna MD as Assigned PCP  Kandi Anderson OD as MD (Ophthalmology)  Andressa Khanna MD as Referring Physician (Internal Medicine)  Richie Harvey MD as MD (Hematology & Oncology)  Hawa Shelton DO as Assigned Neuroscience Provider  Richie Harvey MD as Assigned Cancer Care Provider  Kandi Anderson OD as Assigned Surgical Provider  Odette Ortiz PA-C as Physician Assistant (Dermatology)    The following health maintenance items are reviewed in Epic and correct as of today:  Health Maintenance   Topic Date Due    RSV VACCINE (1 - Risk 60-74 years 1-dose series) Never done    DTAP/TDAP/TD IMMUNIZATION (2 - Td or Tdap) 07/08/2024    LIPID  10/06/2024    COLORECTAL CANCER SCREENING  02/20/2026 (Originally 1/14/1960)    COVID-19 Vaccine (8 - 2024-25 season) 11/14/2024    MEDICARE ANNUAL WELLNESS VISIT  09/19/2025    ANNUAL REVIEW OF HM ORDERS  09/19/2025    FALL RISK ASSESSMENT  09/19/2025    MAMMO SCREENING  10/25/2025    GLUCOSE  03/26/2027    ADVANCE CARE PLANNING  09/19/2029    DEXA  02/01/2039    HEPATITIS C SCREENING  Completed    PHQ-2 (once per calendar year)  Completed    INFLUENZA VACCINE  Completed    Pneumococcal Vaccine: 65+ Years  Completed    ZOSTER IMMUNIZATION  Completed    HPV IMMUNIZATION  Aged Out    MENINGITIS IMMUNIZATION  Aged Out    RSV MONOCLONAL ANTIBODY  Aged Out         Review of Systems  Constitutional, HEENT, cardiovascular, pulmonary, GI, , musculoskeletal, neuro, skin, endocrine and psych systems are negative, except as otherwise noted.     Objective    Exam  /65   Pulse 64   Temp 99.1  F (37.3  C) (Temporal)   Resp 16   Ht 1.515 m (4' 11.65\")   Wt 52.8 kg (116 lb 6.4 oz)   SpO2 96%   BMI 23.00 kg/m     Estimated body mass index is 23 kg/m  as calculated from the following:    Height as of this encounter: 1.515 m (4' 11.65\").    Weight as of this encounter: 52.8 kg (116 lb 6.4 oz).    Physical " Exam  GENERAL: alert and no distress  EYES: Eyes grossly normal to inspection, PERRL and conjunctivae and sclerae normal  HENT: ear canals and TM's normal, nose and mouth without ulcers or lesions  NECK: no adenopathy, no asymmetry, masses, or scars  RESP: lungs clear to auscultation - no rales, rhonchi or wheezes  BREAST: normal without masses, tenderness or nipple discharge and no palpable axillary masses or adenopathy  CV: regular rate and rhythm, normal S1 S2, no S3 or S4, no murmur, click or rub, no peripheral edema  ABDOMEN: soft, nontender, no hepatosplenomegaly, no masses and bowel sounds normal  MS: no gross musculoskeletal defects noted, no edema  SKIN: Single large skin lesion of SK on the back of the trunk measuring around 2 cm in diameter  NEURO: Normal strength and tone, mentation intact and speech normal  PSYCH: mentation appears normal, affect normal/bright         9/19/2024   Mini Cog   Clock Draw Score 2 Normal   3 Item Recall 3 objects recalled   Mini Cog Total Score 5                 Signed Electronically by: Andressa Khanna MD

## 2024-09-23 ENCOUNTER — OFFICE VISIT (OUTPATIENT)
Dept: DERMATOLOGY | Facility: CLINIC | Age: 74
End: 2024-09-23
Attending: INTERNAL MEDICINE
Payer: COMMERCIAL

## 2024-09-23 DIAGNOSIS — D22.9 MULTIPLE BENIGN NEVI: Primary | ICD-10-CM

## 2024-09-23 DIAGNOSIS — L81.4 LENTIGINES: ICD-10-CM

## 2024-09-23 DIAGNOSIS — Z85.828 HISTORY OF BASAL CELL CARCINOMA: ICD-10-CM

## 2024-09-23 DIAGNOSIS — L82.1 SK (SEBORRHEIC KERATOSIS): ICD-10-CM

## 2024-09-23 DIAGNOSIS — D18.01 CHERRY ANGIOMA: ICD-10-CM

## 2024-09-23 DIAGNOSIS — L82.0 INFLAMED SEBORRHEIC KERATOSIS: ICD-10-CM

## 2024-09-23 PROCEDURE — 17110 DESTRUCTION B9 LES UP TO 14: CPT | Performed by: PHYSICIAN ASSISTANT

## 2024-09-23 PROCEDURE — 99203 OFFICE O/P NEW LOW 30 MIN: CPT | Mod: 25 | Performed by: PHYSICIAN ASSISTANT

## 2024-09-23 ASSESSMENT — PAIN SCALES - GENERAL: PAINLEVEL: NO PAIN (0)

## 2024-09-23 NOTE — LETTER
9/23/2024      Dakota Camejo  [address]  [place] [address]      Dear Colleague,    Thank you for referring your patient, Dakota Camejo, to the [facility]. Please see a copy of my visit note below.    [facility] Dermatology Note  Encounter Date: Sep 23, 2024  Office Visit      Dermatology Problem List:  FBSE: 9/23/24    1. Hx of NMSC  - BCC, L medial cheek, S/p Mohs in 2020  ____________________________________________    Assessment & Plan:  # Seborrheic keratosis, inflamed. X 5 back and shoulders  - Cryotherapy performed today, see procedure note below.    # Hx of NMSC, L medial cheek, S/p Mohs 2020, NERD  - Sunscreen: Apply 20 minutes prior to going outdoors and reapply every two hours, when wet or sweating. We recommend using an SPF 30 or higher, and to use one that is water resistant.     - Advised to monitor for changing, non-healing, bleeding, painful, changing, or otherwise symptomatic lesions  - Continue annual skin exams    # Benign findings: multiple benign nevi, lentigines, cherry angiomas, SKs  - edu on benign etiology  - Signs and Symptoms of non-melanoma skin cancer and ABCDEs of melanoma reviewed with patient. Patient encouraged to perform monthly self skin exams and educated on how to perform them. UV precautions reviewed with patient. Patient was asked about new or changing moles/lesions on body.   - Sunscreen: Apply 20 minutes prior to going outdoors and reapply every two hours, when wet or sweating. We recommend using an SPF 30 or higher, and to use one that is water resistant.     - RTC for changes     Procedures Performed:   CRYOTHERAPY PROCEDURE NOTE: 5 lesions in the above locations were treated with liquid nitrogen utilizing a 5-10sec thaw time. Patient was advised that the treated areas will become red, swollen, may develop a blister and then should crust and peal off in the next 1-2 weeks. Post-procedure instructions were  provided.    Follow-up: 1 year(s) in-person, or earlier for new or changing lesions    Staff and scribe:    Scribe Disclosure:   I, PIERRE MAN, am serving as a scribe; to document services personally performed by Odette Ortiz PA-C -based on data collection and the provider's statements to me.     Provider Disclosure:  I agree with above History, Review of Systems, Physical exam and Plan.  I have reviewed the content of the documentation and have edited it as needed. I have personally performed the services documented here and the documentation accurately represents those services and the decisions I have made.      Electronically signed by:    All risks, benefits and alternatives were discussed with patient.  Patient is in agreement and understands the assessment and plan.  All questions were answered.    Odette Ortiz PA-C, [facility]  [facility] [place]: Phone: [phone], Fax: [phone]  [facility]: Phone: [phone],  Fax: [phone]  [facility]: Phone: [phone], Fax: [phone]  ____________________________________________    CC: Skin Check (FBSC, possible AK's on back, would like removed)      Reviewed patients past medical history and pertinent chart review prior to patient's visit today.     HPI:  Ms. Dakota Camejo is a 74 year old female who presents today as a new patient for FBSC. Referred by Jey Abel, referral reviewed. Today patient reported possible AK's on her back.     Has a hx of NMSC, BCC, L medial cheek, S/p Mohs 2020.     Patient is otherwise feeling well, without additional concerns.    Labs:  N/A    Physical Exam:  Vitals: There were no vitals taken for this visit.  SKIN: Total skin excluding the undergarment areas was performed. The exam included the head/face, neck, both arms, chest, back, abdomen, both legs, digits and/or nails.    - - Jesus's skin  type II, has <100 nevi  - There are dome shaped bright red papules on the trunk.   - Multiple regular brown pigmented macules and papules are identified on the trunk and extremities.   - Scattered brown macules on sun exposed areas.  - There are waxy stuck on tan to brown papules on the trunk.    -There are well healed surgical scars without erythema, nodularity or telangiectasias on the prior NMSC sites, NERD   - There is a tan to brown waxy stuck on papule with surrounding erythema on the: x 1 R shoulder, x 1 mid back, x 1 L mid back,   x 2 L shoulder  - No other lesions of concern on areas examined.     Medications:  Current Outpatient Medications   Medication Sig Dispense Refill     alendronate (FOSAMAX) 70 MG tablet Take 1 tablet (70 mg) by mouth every 7 days 12 tablet 3     anastrozole (ARIMIDEX) 1 MG tablet Take 1 tablet (1 mg) by mouth daily 90 tablet 3     Aspirin (BUFFERIN LOW DOSE PO) Take 81 mg by mouth       calcium carbonate 500 mg, elemental, (OSCAL 500) 1250 (500 Ca) MG TABS tablet Take 500 mg by mouth       Cholecalciferol 10 MCG (400 UNIT) CAPS        fish oil-omega-3 fatty acids 1000 MG capsule Take 1,000 mg by mouth       multivitamin w/minerals (MULTI-VITAMIN) tablet Take 1 tablet by mouth daily       No current facility-administered medications for this visit.      Past Medical/Surgical History:   Patient Active Problem List   Diagnosis     Malignant neoplasm of central portion of right breast in female, estrogen receptor positive (H)     History of posttraumatic stress disorder (PTSD)     Hypercholesteremia     Mitral regurgitation     Monoclonal gammopathy     Osteoporosis     PTSD (post-traumatic stress disorder)     Vitreous detachment     History of basal cell carcinoma     Past Medical History:   Diagnosis Date     Arthritis      Cancer (H)                         Again, thank you for allowing me to participate in the care of your patient.        Sincerely,        Odette Ortiz PA-C

## 2024-09-23 NOTE — PATIENT INSTRUCTIONS
Patient Education       Proper skin care from [facility]:    -Eliminate harsh soaps as they strip the natural oils from the skin, often resulting in dry itchy skin ( i.e. Dial, Zest, [place])  -Use mild soaps such as Cetaphil or Dove Sensitive Skin in the shower. You do not need to use soap on arms, legs, and trunk every time you shower unless visibly soiled.   -Avoid hot or cold showers.  -After showering, lightly dry off and apply moisturizing within 2-3 minutes. This will help trap moisture in the skin.   -Aggressive use of a moisturizer at least 1-2 times a day to the entire body (including -Vanicream, Cetaphil, Aquaphor or Cerave) and moisturize hands after every washing.  -We recommend using moisturizers that come in a tub that needs to be scooped out, not a pump. This has more of an oil base. It will hold moisture in your skin much better than a water base moisturizer. The above recommended are non-pore clogging.      Wear a sunscreen with at least SPF 30 on your face, ears, neck and V of the chest daily. Wear sunscreen on other areas of the body if those areas are exposed to the sun throughout the day. Sunscreens can contain physical and/or chemical blockers. Physical blockers are less likely to clog pores, these include zinc oxide and titanium dioxide. Reapply every two hour and after swimming.     Sunscreen examples: https://www.ewg.org/sunscreen/    UV radiation  UVA radiation remains constant throughout the day and throughout the year. It is a longer wavelength than UVB and therefore penetrates deeper into the skin leading to immediate and delayed tanning, photoaging, and skin cancer. 70-80% of UVA and UVB radiation occurs between the hours of 10am-2pm.  UVB radiation  UVB radiation causes the most harmful effects and is more significant during the summer months. However, snow and ice can reflect UVB radiation leading to skin damage during the winter months as well. UVB radiation is  responsible for tanning, burning, inflammation, delayed erythema (pinkness), pigmentation (brown spots), and skin cancer.     I recommend self monthly full body exams and yearly full body exams with a dermatology provider. If you develop a new or changing lesion please follow up for examination. Most skin cancers are pink and scaly or pink and pearly. However, we do see blue/brown/black skin cancers.  Consider the ABCDEs of melanoma when giving yourself your monthly full body exam ( don't forget the groin, buttocks, feet, toes, etc). A-asymmetry, B-borders, C-color, D-diameter, E-elevation or evolving. If you see any of these changes please follow up in clinic. If you cannot see your back I recommend purchasing a hand held mirror to use with a larger wall mirror.       Checking for Skin Cancer  You can find cancer early by checking your skin each month. There are 3 kinds of skin cancer. They are melanoma, basal cell carcinoma, and squamous cell carcinoma. Doing monthly skin checks is the best way to find new marks or skin changes. Follow the instructions below for checking your skin.   The ABCDEs of checking moles for melanoma   Check your moles or growths for signs of melanoma using ABCDE:   Asymmetry: the sides of the mole or growth don t match  Border: the edges are ragged, notched, or blurred  Color: the color within the mole or growth varies  Diameter: the mole or growth is larger than 6 mm (size of a pencil eraser)  Evolving: the size, shape, or color of the mole or growth is changing (evolving is not shown in the images below)    Checking for other types of skin cancer  Basal cell carcinoma or squamous cell carcinoma have symptoms such as:     A spot or mole that looks different from all other marks on your skin  Changes in how an area feels, such as itching, tenderness, or pain  Changes in the skin's surface, such as oozing, bleeding, or scaliness  A sore that does not heal  New swelling or redness beyond  the border of a mole    Who s at risk?  Anyone can get skin cancer. But you are at greater risk if you have:   Fair skin, light-colored hair, or light-colored eyes  Many moles or abnormal moles on your skin  A history of sunburns from sunlight or tanning beds  A family history of skin cancer  A history of exposure to radiation or chemicals  A weakened immune system  If you have had skin cancer in the past, you are at risk for recurring skin cancer.   How to check your skin  Do your monthly skin checkups in front of a full-length mirror. Check all parts of your body, including your:   Head (ears, face, neck, and scalp)  Torso (front, back, and sides)  Arms (tops, undersides, upper, and lower armpits)  Hands (palms, backs, and fingers, including under the nails)  Buttocks and genitals  Legs (front, back, and sides)  Feet (tops, soles, toes, including under the nails, and between toes)  If you have a lot of moles, take digital photos of them each month. Make sure to take photos both up close and from a distance. These can help you see if any moles change over time.   Most skin changes are not cancer. But if you see any changes in your skin, call your doctor right away. Only he or she can diagnose a problem. If you have skin cancer, seeing your doctor can be the first step toward getting the treatment that could save your life.   [employer] last reviewed this educational content on 4/1/2019 2000-2020 [employer]. [address] [place] [address], [place] [address]. All rights reserved. This information is not intended as a substitute for professional medical care. Always follow your healthcare professional's instructions.        Cryotherapy    What is it?  Use of a very cold liquid, such as liquid nitrogen, to freeze and destroy abnormal skin cells that need to be removed    What should I expect?  Tenderness and redness  A small blister that might grow and fill with dark purple blood. There may be crusting.  More  than one treatment may be needed if the lesions do not go away.    How do I care for the treated area?  Gently wash the area with your hands when bathing.  Use a thin layer of Vaseline to help with healing. You may use a Band-Aid.   The area should heal within 7-10 days and may leave behind a pink or lighter color.   Do not use an antibiotic or Neosporin ointment.   You may take acetaminophen (Tylenol) for pain.     Call your Doctor if you have:  Severe pain  Signs of infection (warmth, redness, cloudy yellow drainage, and or a bad smell)  Questions or concerns    Who should I call with questions?      [facility]: [phone]      [facility]: [phone]      For urgent needs outside of business hours call the [facility] at [phone]        and ask for the dermatology resident on call

## 2024-09-23 NOTE — PROGRESS NOTES
[facility] Dermatology Note  Encounter Date: Sep 23, 2024  Office Visit      Dermatology Problem List:  FBSE: 9/23/24    1. Hx of NMSC  - BCC, L medial cheek, S/p Mohs in 2020  ____________________________________________    Assessment & Plan:  # Seborrheic keratosis, inflamed. X 5 back and shoulders  - Cryotherapy performed today, see procedure note below.    # Hx of NMSC, L medial cheek, S/p Mohs 2020, NERD  - Sunscreen: Apply 20 minutes prior to going outdoors and reapply every two hours, when wet or sweating. We recommend using an SPF 30 or higher, and to use one that is water resistant.     - Advised to monitor for changing, non-healing, bleeding, painful, changing, or otherwise symptomatic lesions  - Continue annual skin exams    # Benign findings: multiple benign nevi, lentigines, cherry angiomas, SKs  - edu on benign etiology  - Signs and Symptoms of non-melanoma skin cancer and ABCDEs of melanoma reviewed with patient. Patient encouraged to perform monthly self skin exams and educated on how to perform them. UV precautions reviewed with patient. Patient was asked about new or changing moles/lesions on body.   - Sunscreen: Apply 20 minutes prior to going outdoors and reapply every two hours, when wet or sweating. We recommend using an SPF 30 or higher, and to use one that is water resistant.     - RTC for changes     Procedures Performed:   CRYOTHERAPY PROCEDURE NOTE: 5 lesions in the above locations were treated with liquid nitrogen utilizing a 5-10sec thaw time. Patient was advised that the treated areas will become red, swollen, may develop a blister and then should crust and peal off in the next 1-2 weeks. Post-procedure instructions were provided.    Follow-up: 1 year(s) in-person, or earlier for new or changing lesions    Staff and scribe:    Scribe Disclosure:   PIERRE JADE, am serving as a scribe; to document services personally performed by Odette Ortiz PA-C -based on  data collection and the provider's statements to me.     Provider Disclosure:  I agree with above History, Review of Systems, Physical exam and Plan.  I have reviewed the content of the documentation and have edited it as needed. I have personally performed the services documented here and the documentation accurately represents those services and the decisions I have made.      Electronically signed by:    All risks, benefits and alternatives were discussed with patient.  Patient is in agreement and understands the assessment and plan.  All questions were answered.    Odette Ortiz PA-C, MPAS  [facility]: Phone: [phone], Fax: [phone]  [facility]: Phone: [phone],  Fax: [phone]  [facility]: Phone: [phone], Fax: [phone]  ____________________________________________    CC: Skin Check (FBSC, possible AK's on back, would like removed)      Reviewed patients past medical history and pertinent chart review prior to patient's visit today.     HPI:  Ms. Dakota Camejo is a 74 year old female who presents today as a new patient for FBSC. Referred by Jey Abel, referral reviewed. Today patient reported possible AK's on her back.     Has a hx of NMSC, BCC, L medial cheek, S/p Mohs 2020.     Patient is otherwise feeling well, without additional concerns.    Labs:  N/A    Physical Exam:  Vitals: There were no vitals taken for this visit.  SKIN: Total skin excluding the undergarment areas was performed. The exam included the head/face, neck, both arms, chest, back, abdomen, both legs, digits and/or nails.    - - Jesus's skin type II, has <100 nevi  - There are dome shaped bright red papules on the trunk.   - Multiple regular brown pigmented macules and papules are identified on the trunk and extremities.   - Scattered brown macules on sun exposed areas.  - There are waxy  stuck on tan to brown papules on the trunk.    -There are well healed surgical scars without erythema, nodularity or telangiectasias on the prior NMSC sites, NERD   - There is a tan to brown waxy stuck on papule with surrounding erythema on the: x 1 R shoulder, x 1 mid back, x 1 L mid back,   x 2 L shoulder  - No other lesions of concern on areas examined.     Medications:  Current Outpatient Medications   Medication Sig Dispense Refill    alendronate (FOSAMAX) 70 MG tablet Take 1 tablet (70 mg) by mouth every 7 days 12 tablet 3    anastrozole (ARIMIDEX) 1 MG tablet Take 1 tablet (1 mg) by mouth daily 90 tablet 3    Aspirin (BUFFERIN LOW DOSE PO) Take 81 mg by mouth      calcium carbonate 500 mg, elemental, (OSCAL 500) 1250 (500 Ca) MG TABS tablet Take 500 mg by mouth      Cholecalciferol 10 MCG (400 UNIT) CAPS       fish oil-omega-3 fatty acids 1000 MG capsule Take 1,000 mg by mouth      multivitamin w/minerals (MULTI-VITAMIN) tablet Take 1 tablet by mouth daily       No current facility-administered medications for this visit.      Past Medical/Surgical History:   Patient Active Problem List   Diagnosis    Malignant neoplasm of central portion of right breast in female, estrogen receptor positive (H)    History of posttraumatic stress disorder (PTSD)    Hypercholesteremia    Mitral regurgitation    Monoclonal gammopathy    Osteoporosis    PTSD (post-traumatic stress disorder)    Vitreous detachment    History of basal cell carcinoma     Past Medical History:   Diagnosis Date    Arthritis     Cancer (H)

## 2024-10-04 ENCOUNTER — LAB (OUTPATIENT)
Dept: LAB | Facility: CLINIC | Age: 74
End: 2024-10-04
Payer: COMMERCIAL

## 2024-10-04 DIAGNOSIS — C50.911 MALIGNANT NEOPLASM OF RIGHT FEMALE BREAST, UNSPECIFIED ESTROGEN RECEPTOR STATUS, UNSPECIFIED SITE OF BREAST (H): ICD-10-CM

## 2024-10-04 DIAGNOSIS — Z00.00 ENCOUNTER FOR MEDICARE ANNUAL WELLNESS EXAM: ICD-10-CM

## 2024-10-04 DIAGNOSIS — M81.0 AGE RELATED OSTEOPOROSIS, UNSPECIFIED PATHOLOGICAL FRACTURE PRESENCE: ICD-10-CM

## 2024-10-04 DIAGNOSIS — Z79.811 LONG TERM (CURRENT) USE OF AROMATASE INHIBITORS: ICD-10-CM

## 2024-10-04 DIAGNOSIS — D47.2 MGUS (MONOCLONAL GAMMOPATHY OF UNKNOWN SIGNIFICANCE): ICD-10-CM

## 2024-10-04 DIAGNOSIS — E78.5 DYSLIPIDEMIA: ICD-10-CM

## 2024-10-04 DIAGNOSIS — M80.00XD OSTEOPOROSIS WITH CURRENT PATHOLOGICAL FRACTURE WITH ROUTINE HEALING, UNSPECIFIED OSTEOPOROSIS TYPE, SUBSEQUENT ENCOUNTER: ICD-10-CM

## 2024-10-04 LAB
ALBUMIN SERPL BCG-MCNC: 4.3 G/DL (ref 3.5–5.2)
ALP SERPL-CCNC: 85 U/L (ref 40–150)
ALT SERPL W P-5'-P-CCNC: 12 U/L (ref 0–50)
ANION GAP SERPL CALCULATED.3IONS-SCNC: 11 MMOL/L (ref 7–15)
AST SERPL W P-5'-P-CCNC: 19 U/L (ref 0–45)
BASOPHILS # BLD AUTO: 0.1 10E3/UL (ref 0–0.2)
BASOPHILS NFR BLD AUTO: 1 %
BILIRUB SERPL-MCNC: 0.4 MG/DL
BUN SERPL-MCNC: 14 MG/DL (ref 8–23)
CALCIUM SERPL-MCNC: 9.4 MG/DL (ref 8.8–10.4)
CANCER AG15-3 SERPL-ACNC: 17 U/ML
CEA SERPL-MCNC: 1.9 NG/ML
CHLORIDE SERPL-SCNC: 104 MMOL/L (ref 98–107)
CHOLEST SERPL-MCNC: 229 MG/DL
CREAT SERPL-MCNC: 0.69 MG/DL (ref 0.51–0.95)
EGFRCR SERPLBLD CKD-EPI 2021: >90 ML/MIN/1.73M2
EOSINOPHIL # BLD AUTO: 0.1 10E3/UL (ref 0–0.7)
EOSINOPHIL NFR BLD AUTO: 2 %
ERYTHROCYTE [DISTWIDTH] IN BLOOD BY AUTOMATED COUNT: 13 % (ref 10–15)
FASTING STATUS PATIENT QL REPORTED: YES
FASTING STATUS PATIENT QL REPORTED: YES
GLUCOSE SERPL-MCNC: 97 MG/DL (ref 70–99)
HCO3 SERPL-SCNC: 24 MMOL/L (ref 22–29)
HCT VFR BLD AUTO: 37.6 % (ref 35–47)
HDLC SERPL-MCNC: 85 MG/DL
HGB BLD-MCNC: 12.5 G/DL (ref 11.7–15.7)
IMM GRANULOCYTES # BLD: 0 10E3/UL
IMM GRANULOCYTES NFR BLD: 0 %
KAPPA LC FREE SER-MCNC: 1.99 MG/DL (ref 0.33–1.94)
KAPPA LC FREE/LAMBDA FREE SER NEPH: 0.98 {RATIO} (ref 0.26–1.65)
LAMBDA LC FREE SERPL-MCNC: 2.04 MG/DL (ref 0.57–2.63)
LDLC SERPL CALC-MCNC: 128 MG/DL
LYMPHOCYTES # BLD AUTO: 1.4 10E3/UL (ref 0.8–5.3)
LYMPHOCYTES NFR BLD AUTO: 28 %
MCH RBC QN AUTO: 31.6 PG (ref 26.5–33)
MCHC RBC AUTO-ENTMCNC: 33.2 G/DL (ref 31.5–36.5)
MCV RBC AUTO: 95 FL (ref 78–100)
MONOCYTES # BLD AUTO: 0.4 10E3/UL (ref 0–1.3)
MONOCYTES NFR BLD AUTO: 9 %
NEUTROPHILS # BLD AUTO: 3 10E3/UL (ref 1.6–8.3)
NEUTROPHILS NFR BLD AUTO: 60 %
NONHDLC SERPL-MCNC: 144 MG/DL
PLATELET # BLD AUTO: 276 10E3/UL (ref 150–450)
POTASSIUM SERPL-SCNC: 4.1 MMOL/L (ref 3.4–5.3)
PROT SERPL-MCNC: 7.2 G/DL (ref 6.4–8.3)
RBC # BLD AUTO: 3.96 10E6/UL (ref 3.8–5.2)
SODIUM SERPL-SCNC: 139 MMOL/L (ref 135–145)
TOTAL PROTEIN SERUM FOR ELP: 7 G/DL (ref 6.4–8.3)
TRIGL SERPL-MCNC: 78 MG/DL
VIT D+METAB SERPL-MCNC: 55 NG/ML (ref 20–50)
WBC # BLD AUTO: 5 10E3/UL (ref 4–11)

## 2024-10-04 PROCEDURE — 83521 IG LIGHT CHAINS FREE EACH: CPT

## 2024-10-04 PROCEDURE — 84155 ASSAY OF PROTEIN SERUM: CPT | Mod: 59

## 2024-10-04 PROCEDURE — 82378 CARCINOEMBRYONIC ANTIGEN: CPT

## 2024-10-04 PROCEDURE — 80061 LIPID PANEL: CPT

## 2024-10-04 PROCEDURE — 36415 COLL VENOUS BLD VENIPUNCTURE: CPT

## 2024-10-04 PROCEDURE — 84165 PROTEIN E-PHORESIS SERUM: CPT | Performed by: PATHOLOGY

## 2024-10-04 PROCEDURE — 86300 IMMUNOASSAY TUMOR CA 15-3: CPT

## 2024-10-04 PROCEDURE — 82306 VITAMIN D 25 HYDROXY: CPT

## 2024-10-04 PROCEDURE — 80053 COMPREHEN METABOLIC PANEL: CPT

## 2024-10-04 PROCEDURE — 85025 COMPLETE CBC W/AUTO DIFF WBC: CPT

## 2024-10-08 LAB
ALBUMIN SERPL ELPH-MCNC: 4.3 G/DL (ref 3.7–5.1)
ALPHA1 GLOB SERPL ELPH-MCNC: 0.3 G/DL (ref 0.2–0.4)
ALPHA2 GLOB SERPL ELPH-MCNC: 0.7 G/DL (ref 0.5–0.9)
B-GLOBULIN SERPL ELPH-MCNC: 0.8 G/DL (ref 0.6–1)
GAMMA GLOB SERPL ELPH-MCNC: 0.9 G/DL (ref 0.7–1.6)
LOCATION OF TASK: ABNORMAL
M PROTEIN SERPL ELPH-MCNC: 0.2 G/DL
PROT PATTERN SERPL ELPH-IMP: ABNORMAL

## 2024-10-08 NOTE — RESULT ENCOUNTER NOTE
Your Cholesterol is borderline as seen in the past . Given your age and no cardiac risk factors , recommend dietery management of avoiding high fat foods and red meat.    Your Vitamin D levels are abnormally high causing toxicity . Please hold off any supplements you are taking for 2 weeks and later start only on 1000 units daily.     Please let me know if you have any questions.  Andressa Khanna MD on 10/7/2024   [facility]

## 2024-10-29 ENCOUNTER — ANCILLARY PROCEDURE (OUTPATIENT)
Dept: MAMMOGRAPHY | Facility: CLINIC | Age: 74
End: 2024-10-29
Attending: INTERNAL MEDICINE
Payer: COMMERCIAL

## 2024-10-29 DIAGNOSIS — Z12.31 VISIT FOR SCREENING MAMMOGRAM: ICD-10-CM

## 2024-10-29 PROCEDURE — 77067 SCR MAMMO BI INCL CAD: CPT | Mod: TC | Performed by: RADIOLOGY

## 2024-10-29 PROCEDURE — 77063 BREAST TOMOSYNTHESIS BI: CPT | Mod: TC | Performed by: RADIOLOGY

## 2024-11-01 ENCOUNTER — DOCUMENTATION ONLY (OUTPATIENT)
Dept: OTHER | Facility: CLINIC | Age: 74
End: 2024-11-01
Payer: COMMERCIAL

## 2024-12-02 ENCOUNTER — ONCOLOGY VISIT (OUTPATIENT)
Dept: ONCOLOGY | Facility: CLINIC | Age: 74
End: 2024-12-02
Attending: INTERNAL MEDICINE
Payer: COMMERCIAL

## 2024-12-02 VITALS
DIASTOLIC BLOOD PRESSURE: 81 MMHG | OXYGEN SATURATION: 97 % | TEMPERATURE: 97.8 F | WEIGHT: 117 LBS | HEART RATE: 96 BPM | BODY MASS INDEX: 23.12 KG/M2 | SYSTOLIC BLOOD PRESSURE: 120 MMHG | RESPIRATION RATE: 18 BRPM

## 2024-12-02 DIAGNOSIS — Z79.811 LONG TERM (CURRENT) USE OF AROMATASE INHIBITORS: ICD-10-CM

## 2024-12-02 DIAGNOSIS — Z00.00 ENCOUNTER FOR MEDICARE ANNUAL WELLNESS EXAM: ICD-10-CM

## 2024-12-02 DIAGNOSIS — C50.911 MALIGNANT NEOPLASM OF RIGHT FEMALE BREAST, UNSPECIFIED ESTROGEN RECEPTOR STATUS, UNSPECIFIED SITE OF BREAST (H): ICD-10-CM

## 2024-12-02 PROCEDURE — G2211 COMPLEX E/M VISIT ADD ON: HCPCS | Performed by: INTERNAL MEDICINE

## 2024-12-02 PROCEDURE — 99214 OFFICE O/P EST MOD 30 MIN: CPT | Performed by: INTERNAL MEDICINE

## 2024-12-02 PROCEDURE — G0463 HOSPITAL OUTPT CLINIC VISIT: HCPCS | Performed by: INTERNAL MEDICINE

## 2024-12-02 RX ORDER — ANASTROZOLE 1 MG/1
1 TABLET ORAL DAILY
Qty: 90 TABLET | Refills: 3 | Status: SHIPPED | OUTPATIENT
Start: 2024-12-02

## 2024-12-02 RX ORDER — ALENDRONATE SODIUM 70 MG/1
70 TABLET ORAL
Qty: 12 TABLET | Refills: 3 | Status: SHIPPED | OUTPATIENT
Start: 2024-12-02

## 2024-12-02 ASSESSMENT — PAIN SCALES - GENERAL: PAINLEVEL_OUTOF10: NO PAIN (0)

## 2024-12-02 NOTE — PROGRESS NOTES
"Oncology Rooming Note    December 2, 2024 1:46 PM   Dakota Camejo is a 74 year old female who presents for:    Chief Complaint   Patient presents with    Oncology Clinic Visit     History of basal cell carcinoma  Malignant neoplasm of central portion of right breast in female, estrogen receptor positive (H)          Initial Vitals: /81 (BP Location: Right arm, Patient Position: Sitting, Cuff Size: Adult Regular)   Pulse 96   Temp 97.8  F (36.6  C) (Oral)   Resp 18   Wt 53.1 kg (117 lb)   SpO2 97%   BMI 23.12 kg/m   Estimated body mass index is 23.12 kg/m  as calculated from the following:    Height as of 9/19/24: 1.515 m (4' 11.65\").    Weight as of this encounter: 53.1 kg (117 lb). Body surface area is 1.49 meters squared.  No Pain (0) Comment: Data Unavailable   No LMP recorded. Patient has had a hysterectomy.  Allergies reviewed: Yes  Medications reviewed: Yes    Medications: Medication refills not needed today.  Pharmacy name entered into Mi[employer]: [id number] - [place], [place] - [address] AT [facility] [address] [place] [address]    Frailty Screening:   Is the patient here for a new oncology consult visit in cancer care? 2. No      Clinical concerns: no        Sheridan Pimentel CMA              "

## 2024-12-02 NOTE — PROGRESS NOTES
[facility]    Hematology/Oncology return patient note      Today's Date: December 2, 2024  Reason for Consult: History of breast cancer        HISTORY OF PRESENT ILLNESS: Dakota is a very pleasant 74-year-old female with the following oncologic history  1.  History of stage I invasive ductal carcinoma of the right breast status postlumpectomy with sentinel node biopsy T1b N0 M0, grade 1, ER +90% IN 30% HER2/jacqui FISH nonamplified.  Ki-67 10%.  Oncotype DX score of 20.  Diagnosed in October 2020.    2.  Status post radiation. Started adjuvant Arimidex in December 2020  3.  History of osteoporosis started Fosamax in 2020 January, declined Zometa  4.  History of basal cell carcinoma  5.  History of IgG kappa MGUS diagnosed in November 2022 being followed yearly  6 dexa 2024 feb t score -2.3    Interval history: Dakota returns for follow-up today.  She feels well.  Mammogram in October was normal  The.  Her blood work is overall stable except for mild LDL elevation at 129    REVIEW OF SYSTEMS:   14 point ROS was reviewed and is negative other than as noted above in HPI.       HOME MEDICATIONS:  Current Outpatient Medications   Medication Sig Dispense Refill    alendronate (FOSAMAX) 70 MG tablet Take 1 tablet (70 mg) by mouth every 7 days 12 tablet 3    anastrozole (ARIMIDEX) 1 MG tablet Take 1 tablet (1 mg) by mouth daily 90 tablet 3    Aspirin (BUFFERIN LOW DOSE PO) Take 81 mg by mouth      calcium carbonate 500 mg, elemental, (OSCAL 500) 1250 (500 Ca) MG TABS tablet Take 500 mg by mouth      Cholecalciferol 10 MCG (400 UNIT) CAPS       fish oil-omega-3 fatty acids 1000 MG capsule Take 1,000 mg by mouth      multivitamin w/minerals (MULTI-VITAMIN) tablet Take 1 tablet by mouth daily           ALLERGIES:  Allergies   Allergen Reactions    Amoxicillin Swelling    Sulfa Antibiotics      rash    Amoxicillin-Pot Clavulanate Rash    Erythromycin Swelling     hives    Other Reaction(s): Swelling,  lips/tongue         PAST MEDICAL HISTORY:  Noted and reviewed    PAST SURGICAL HISTORY:  Noted and reviewed      SOCIAL HISTORY:  Social History     Socioeconomic History    Marital status:      Spouse name: Not on file    Number of children: Not on file    Years of education: Not on file    Highest education level: Not on file   Occupational History    Not on file   Tobacco Use    Smoking status: Former     Current packs/day: 0.00     Average packs/day: 0.5 packs/day for 8.0 years (4.0 ttl pk-yrs)     Types: Cigarettes     Start date: 1971     Quit date: 1975     Years since quittin.9    Smokeless tobacco: Never    Tobacco comments:     Started smoking at 21 yrs age and stopped 26 yrs age .      3-4 Cig / day .    Vaping Use    Vaping status: Never Used   Substance and Sexual Activity    Alcohol use: Yes    Drug use: Never    Sexual activity: Not Currently     Partners: Male     Birth control/protection: Post-menopausal   Other Topics Concern    Parent/sibling w/ CABG, MI or angioplasty before 65F 55M? No   Social History Narrative    Not on file     Social Drivers of Health     Financial Resource Strain: Low Risk  (2024)    Financial Resource Strain     Within the past 12 months, have you or your family members you live with been unable to get utilities (heat, electricity) when it was really needed?: No   Food Insecurity: Low Risk  (2024)    Food Insecurity     Within the past 12 months, did you worry that your food would run out before you got money to buy more?: No     Within the past 12 months, did the food you bought just not last and you didn t have money to get more?: No   Transportation Needs: Low Risk  (2024)    Transportation Needs     Within the past 12 months, has lack of transportation kept you from medical appointments, getting your medicines, non-medical meetings or appointments, work, or from getting things that you need?: No   Physical Activity: Sufficiently  Active (2024)    Exercise Vital Sign     Days of Exercise per Week: 7 days     Minutes of Exercise per Session: 60 min   Stress: No Stress Concern Present (2024)    [employer] - Occupational Stress Questionnaire     Feeling of Stress : Not at all   Social Connections: Unknown (2024)    Social Connection and Isolation Panel [NHANES]     Frequency of Communication with Friends and Family: Not on file     Frequency of Social Gatherings with Friends and Family: Three times a week     Attends Mandaen Services: Not on file     Active Member of Clubs or Organizations: Not on file     Attends Club or Organization Meetings: Not on file     Marital Status: Not on file   Interpersonal Safety: Low Risk  (10/3/2023)    Interpersonal Safety     Do you feel physically and emotionally safe where you currently live?: Yes     Within the past 12 months, have you been hit, slapped, kicked or otherwise physically hurt by someone?: No     Within the past 12 months, have you been humiliated or emotionally abused in other ways by your partner or ex-partner?: No   Housing Stability: Low Risk  (2024)    Housing Stability     Do you have housing? : Yes     Are you worried about losing your housing?: No         FAMILY HISTORY:  Family History   Problem Relation Age of Onset    Glaucoma Other     Diabetes Maternal Grandmother     Cerebrovascular Disease Maternal Grandmother     Diabetes Brother     Macular Degeneration No family hx of          PHYSICAL EXAM:  Vital signs:  /81 (BP Location: Right arm, Patient Position: Sitting, Cuff Size: Adult Regular)   Pulse 96   Temp 97.8  F (36.6  C) (Oral)   Resp 18   Wt 53.1 kg (117 lb)   SpO2 97%   BMI 23.12 kg/m     ECO  GENERAL/CONSTITUTIONAL: No acute distress.  Bilateral breast exams normal no evidence of recurrence    LABS:  Noted and stable . Reviewed with the patient     PATHOLOGY:  Noted as per HPI    IMAGING:  Noted as  per  HPI    ASSESSMENT/PLAN:  Dakota is a very pleasant  74 year old female with history of breast cancer and MGUS      1 Breast cancer  On arimidex   She can consider 7 years of antiestrogen therapy however I would recommend 5 years should be sufficient      2 bone health  on fosamax since 20 reviewed and looks improved . Should stop fosamax in 2025 december Declined zometa   Update dexa looks better    3 labs and md visit in 6 months     Total time spent on day of visit, including review of tests, obtaining/reviewing separately obtained history, ordering medications/tests/procedures, communicating with PCP/consultants, and documenting in electronic medical record: 30  minutes       Richie Harvey MD  Hematology/Oncology  [facility]

## 2024-12-02 NOTE — LETTER
12/2/2024      Dakota Camejo  [address]  [place] [address]      Dear Colleague,    Thank you for referring your patient, Dakota Camejo, to the [facility]. Please see a copy of my visit note below.    [facility]    Hematology/Oncology return patient note      Today's Date: December 2, 2024  Reason for Consult: History of breast cancer        HISTORY OF PRESENT ILLNESS: Dakota is a very pleasant 74-year-old female with the following oncologic history  1.  History of stage I invasive ductal carcinoma of the right breast status postlumpectomy with sentinel node biopsy T1b N0 M0, grade 1, ER +90% OR 30% HER2/jacqui FISH nonamplified.  Ki-67 10%.  Oncotype DX score of 20.  Diagnosed in October 2020.    2.  Status post radiation. Started adjuvant Arimidex in December 2020  3.  History of osteoporosis started Fosamax in 2020 January, declined Zometa  4.  History of basal cell carcinoma  5.  History of IgG kappa MGUS diagnosed in November 2022 being followed yearly  6 dexa 2024 feb t score -2.3    Interval history: Dakota returns for follow-up today.  She feels well.  Mammogram in October was normal  The.  Her blood work is overall stable except for mild LDL elevation at 129    REVIEW OF SYSTEMS:   14 point ROS was reviewed and is negative other than as noted above in HPI.       HOME MEDICATIONS:  Current Outpatient Medications   Medication Sig Dispense Refill     alendronate (FOSAMAX) 70 MG tablet Take 1 tablet (70 mg) by mouth every 7 days 12 tablet 3     anastrozole (ARIMIDEX) 1 MG tablet Take 1 tablet (1 mg) by mouth daily 90 tablet 3     Aspirin (BUFFERIN LOW DOSE PO) Take 81 mg by mouth       calcium carbonate 500 mg, elemental, (OSCAL 500) 1250 (500 Ca) MG TABS tablet Take 500 mg by mouth       Cholecalciferol 10 MCG (400 UNIT) CAPS        fish oil-omega-3 fatty acids 1000 MG capsule Take 1,000 mg by mouth       multivitamin w/minerals (MULTI-VITAMIN) tablet  Take 1 tablet by mouth daily           ALLERGIES:  Allergies   Allergen Reactions     Amoxicillin Swelling     Sulfa Antibiotics      rash     Amoxicillin-Pot Clavulanate Rash     Erythromycin Swelling     hives    Other Reaction(s): Swelling, lips/tongue         PAST MEDICAL HISTORY:  Noted and reviewed    PAST SURGICAL HISTORY:  Noted and reviewed      SOCIAL HISTORY:  Social History     Socioeconomic History     Marital status:      Spouse name: Not on file     Number of children: Not on file     Years of education: Not on file     Highest education level: Not on file   Occupational History     Not on file   Tobacco Use     Smoking status: Former     Current packs/day: 0.00     Average packs/day: 0.5 packs/day for 8.0 years (4.0 ttl pk-yrs)     Types: Cigarettes     Start date: 1971     Quit date: 1975     Years since quittin.9     Smokeless tobacco: Never     Tobacco comments:     Started smoking at 21 yrs age and stopped 26 yrs age .      3-4 Cig / day .    Vaping Use     Vaping status: Never Used   Substance and Sexual Activity     Alcohol use: Yes     Drug use: Never     Sexual activity: Not Currently     Partners: Male     Birth control/protection: Post-menopausal   Other Topics Concern     Parent/sibling w/ CABG, MI or angioplasty before 65F 55M? No   Social History Narrative     Not on file     Social Drivers of Health     Financial Resource Strain: Low Risk  (2024)    Financial Resource Strain      Within the past 12 months, have you or your family members you live with been unable to get utilities (heat, electricity) when it was really needed?: No   Food Insecurity: Low Risk  (2024)    Food Insecurity      Within the past 12 months, did you worry that your food would run out before you got money to buy more?: No      Within the past 12 months, did the food you bought just not last and you didn t have money to get more?: No   Transportation Needs: Low Risk  (2024)     Transportation Needs      Within the past 12 months, has lack of transportation kept you from medical appointments, getting your medicines, non-medical meetings or appointments, work, or from getting things that you need?: No   Physical Activity: Sufficiently Active (9/14/2024)    Exercise Vital Sign      Days of Exercise per Week: 7 days      Minutes of Exercise per Session: 60 min   Stress: No Stress Concern Present (9/14/2024)    [place] [employer] - Occupational Stress Questionnaire      Feeling of Stress : Not at all   Social Connections: Unknown (9/14/2024)    Social Connection and Isolation Panel [NHANES]      Frequency of Communication with Friends and Family: Not on file      Frequency of Social Gatherings with Friends and Family: Three times a week      Attends Voodoo Services: Not on file      Active Member of Clubs or Organizations: Not on file      Attends Club or Organization Meetings: Not on file      Marital Status: Not on file   Interpersonal Safety: Low Risk  (10/3/2023)    Interpersonal Safety      Do you feel physically and emotionally safe where you currently live?: Yes      Within the past 12 months, have you been hit, slapped, kicked or otherwise physically hurt by someone?: No      Within the past 12 months, have you been humiliated or emotionally abused in other ways by your partner or ex-partner?: No   Housing Stability: Low Risk  (9/14/2024)    Housing Stability      Do you have housing? : Yes      Are you worried about losing your housing?: No         FAMILY HISTORY:  Family History   Problem Relation Age of Onset     Glaucoma Other      Diabetes Maternal Grandmother      Cerebrovascular Disease Maternal Grandmother      Diabetes Brother      Macular Degeneration No family hx of          PHYSICAL EXAM:  Vital signs:  /81 (BP Location: Right arm, Patient Position: Sitting, Cuff Size: Adult Regular)   Pulse 96   Temp 97.8  F (36.6  C) (Oral)   Resp 18   Wt  "53.1 kg (117 lb)   SpO2 97%   BMI 23.12 kg/m     ECO  GENERAL/CONSTITUTIONAL: No acute distress.  Bilateral breast exams normal no evidence of recurrence    LABS:  Noted and stable . Reviewed with the patient     PATHOLOGY:  Noted as per HPI    IMAGING:  Noted as  per HPI    ASSESSMENT/PLAN:  Dakota is a very pleasant  74 year old female with history of breast cancer and MGUS      1 Breast cancer  On arimidex   She can consider 7 years of antiestrogen therapy however I would recommend 5 years should be sufficient      2 bone health  on fosamax since  reviewed and looks improved . Should stop fosamax in 2025 zometa   Update dexa looks better    3 labs and md visit in 6 months     Total time spent on day of visit, including review of tests, obtaining/reviewing separately obtained history, ordering medications/tests/procedures, communicating with PCP/consultants, and documenting in electronic medical record: 30  minutes       Richie Harvey MD  Hematology/Oncology  [facility]     Oncology Rooming Note    2024 1:46 PM   Dakota Camejo is a 74 year old female who presents for:    Chief Complaint   Patient presents with     Oncology Clinic Visit     History of basal cell carcinoma  Malignant neoplasm of central portion of right breast in female, estrogen receptor positive (H)          Initial Vitals: /81 (BP Location: Right arm, Patient Position: Sitting, Cuff Size: Adult Regular)   Pulse 96   Temp 97.8  F (36.6  C) (Oral)   Resp 18   Wt 53.1 kg (117 lb)   SpO2 97%   BMI 23.12 kg/m   Estimated body mass index is 23.12 kg/m  as calculated from the following:    Height as of 24: 1.515 m (4' 11.65\").    Weight as of this encounter: 53.1 kg (117 lb). Body surface area is 1.49 meters squared.  No Pain (0) Comment: Data Unavailable   No LMP recorded. Patient has had a hysterectomy.  Allergies reviewed: Yes  Medications reviewed: Yes    Medications: " Medication refills not needed today.  Pharmacy name entered into [employer]: [id number] - [place] [facility] [place] [place] - [address] AT [facility] [address]    Frailty Screening:   Is the patient here for a new oncology consult visit in cancer care? 2. No      Clinical concerns: no        Sheridan Pimentel CMA                Again, thank you for allowing me to participate in the care of your patient.        Sincerely,        Richie Harvey MD

## 2025-01-15 ENCOUNTER — VIRTUAL VISIT (OUTPATIENT)
Dept: FAMILY MEDICINE | Facility: CLINIC | Age: 75
End: 2025-01-15
Payer: COMMERCIAL

## 2025-01-15 DIAGNOSIS — J01.00 ACUTE MAXILLARY SINUSITIS, RECURRENCE NOT SPECIFIED: Primary | ICD-10-CM

## 2025-01-15 DIAGNOSIS — J06.9 VIRAL URI: ICD-10-CM

## 2025-01-15 DIAGNOSIS — R09.81 NASAL CONGESTION: ICD-10-CM

## 2025-01-15 RX ORDER — DOXYCYCLINE 100 MG/1
100 CAPSULE ORAL 2 TIMES DAILY
Qty: 14 CAPSULE | Refills: 0 | Status: SHIPPED | OUTPATIENT
Start: 2025-01-15

## 2025-01-15 NOTE — PROGRESS NOTES
Dakota is a 75 year old who is being evaluated via a billable video visit.    How would you like to obtain your AVS? MyChart  If the video visit is dropped, the invitation should be resent by: Text to cell phone: [phone]  Will anyone else be joining your video visit? No      Assessment & Plan   Problem List Items Addressed This Visit    None  Visit Diagnoses       Acute maxillary sinusitis, recurrence not specified    -  Primary    Relevant Medications    doxycycline hyclate (VIBRAMYCIN) 100 MG capsule    Viral URI        Nasal congestion                 Discussed using his nasal decongestant antihistamine nasal saline rinses and nasal Flonase keep well-hydrated humidifier supportive treatment.  Patient adamant on starting antibiotic, her symptoms are often nearly 5 days now that she describes as severe with blood and prescribed doxycycline for 5 to 7 days.  Advised on method of taking and side effects of medication/antibiotics; take with 8 ounces glass of water..  If any worsening symptoms seek immediate medical attention.  All questions answered.  Advised this is could still be a viral illness and antibiotics may not help.  Patient reiterated understanding     See Patient Instructions      Subjective   Dakota is a 75 year old, presenting for the following health issues:  Sinus Problem        9/19/2024     1:54 PM   Additional Questions   Roomed by Sharon SCALES       Video Start Time:  9:35 AM    History of Present Illness       Reason for visit:  Feels like sinus infection  Symptom onset:  3-7 days ago  Symptoms include:  Head cold with severe sinus blockage  Symptom intensity:  Moderate  Symptom progression:  Worsening  Had these symptoms before:  Yes  Has tried/received treatment for these symptoms:  Yes  Previous treatment was successful:  Yes  Prior treatment description:  Antibiotics  What makes it worse:  No  What makes it better:  No   She is taking medications regularly.     Patient presents for evaluation of  "possible sinus infection, describes some blockage in her head and his eyes hurt has headache, had same symptoms last year and she went to the doctor who prescribed antibiotic and helped immediately requesting antibiotic treatment.  She feels very congested symptoms started last Saturday with fever 5 days now, she feels she is not draining through her nose, she is sneezing a lot she has some yellow-green phlegm nasal discharge, she feels her cheek hurt bilateral TMJ joints hurt her teeth hurts she has headache bilateral, she feels all her head is hurting.  Is hard to breathe through her mouth closed.  She feels \"stuffed up\" she did some saline gargles which helped with her sore throat.  She tested negative for COVID.  Did not test for flu.  She fell to the wrist but did not check the thermometer; is not working.      Review of Systems  Constitutional, HEENT, cardiovascular, pulmonary, gi and gu systems are negative, except as otherwise noted.      Objective           Vitals:  No vitals were obtained today due to virtual visit.    Physical Exam   She will hold due to # woundGENERAL: alert and no distress  EYES: Eyes grossly normal to inspection.  No discharge or erythema, or obvious scleral/conjunctival abnormalities.  RESP: No audible wheeze, cough, or visible cyanosis.    SKIN: Visible skin clear. No significant rash, abnormal pigmentation or lesions.  NEURO: Cranial nerves grossly intact.  Mentation and speech appropriate for age.  PSYCH: Appropriate affect, tone, and pace of words    Lab on 10/04/2024   Component Date Value Ref Range Status    Sodium 10/04/2024 139  135 - 145 mmol/L Final    Potassium 10/04/2024 4.1  3.4 - 5.3 mmol/L Final    Carbon Dioxide (CO2) 10/04/2024 24  22 - 29 mmol/L Final    Anion Gap 10/04/2024 11  7 - 15 mmol/L Final    Urea Nitrogen 10/04/2024 14.0  8.0 - 23.0 mg/dL Final    Creatinine 10/04/2024 0.69  0.51 - 0.95 mg/dL Final    GFR Estimate 10/04/2024 >90  >60 mL/min/1.73m2 Final    " eGFR calculated using 2021 CKD-EPI equation.    Calcium 10/04/2024 9.4  8.8 - 10.4 mg/dL Final    Reference intervals for this test were updated on 7/16/2024 to reflect our healthy population more accurately. There may be differences in the flagging of prior results with similar values performed with this method. Those prior results can be interpreted in the context of the updated reference intervals.    Chloride 10/04/2024 104  98 - 107 mmol/L Final    Glucose 10/04/2024 97  70 - 99 mg/dL Final    Alkaline Phosphatase 10/04/2024 85  40 - 150 U/L Final    AST 10/04/2024 19  0 - 45 U/L Final    ALT 10/04/2024 12  0 - 50 U/L Final    Protein Total 10/04/2024 7.2  6.4 - 8.3 g/dL Final    Albumin 10/04/2024 4.3  3.5 - 5.2 g/dL Final    Bilirubin Total 10/04/2024 0.4  <=1.2 mg/dL Final    Patient Fasting > 8hrs? 10/04/2024 Yes   Final    CEA 10/04/2024 1.9  ng/mL Final    Nonsmoker (past/never) <=5.0 ng/mL   Current smoker <=6.5 ng/mL     This result is obtained using the Roche Elecsys CEA method on the alberto e801 immunoassay analyzer. Results obtained with different assay methods or kits cannot be used interchangeably.    Cancer Antigen 15-3 10/04/2024 17  <=25 U/mL Final    This result is obtained using the Roche Elecsys CA 15-3 II method on the alberto e801 immunoassay analyzer. Results obtained with different assay methods or kits cannot be used interchangeably.    Kappa Free Light Chains 10/04/2024 1.99 (H)  0.33 - 1.94 mg/dL Final    Lambda Free Light Chains 10/04/2024 2.04  0.57 - 2.63 mg/dL Final    Kappa /Lambda Ratio 10/04/2024 0.98  0.26 - 1.65 Final    Cholesterol 10/04/2024 229 (H)  <200 mg/dL Final    Triglycerides 10/04/2024 78  <150 mg/dL Final    Direct Measure HDL 10/04/2024 85  >=50 mg/dL Final    LDL Cholesterol Calculated 10/04/2024 128 (H)  <100 mg/dL Final    Non HDL Cholesterol 10/04/2024 144 (H)  <130 mg/dL Final    Patient Fasting > 8hrs? 10/04/2024 Yes   Final    Vitamin D, Total (25-Hydroxy)  10/04/2024 55 (H)  20 - 50 ng/mL Final    indicates supplementation, with increased risk of hypercalciuria    WBC Count 10/04/2024 5.0  4.0 - 11.0 10e3/uL Final    RBC Count 10/04/2024 3.96  3.80 - 5.20 10e6/uL Final    Hemoglobin 10/04/2024 12.5  11.7 - 15.7 g/dL Final    Hematocrit 10/04/2024 37.6  35.0 - 47.0 % Final    MCV 10/04/2024 95  78 - 100 fL Final    MCH 10/04/2024 31.6  26.5 - 33.0 pg Final    MCHC 10/04/2024 33.2  31.5 - 36.5 g/dL Final    RDW 10/04/2024 13.0  10.0 - 15.0 % Final    Platelet Count 10/04/2024 276  150 - 450 10e3/uL Final    % Neutrophils 10/04/2024 60  % Final    % Lymphocytes 10/04/2024 28  % Final    % Monocytes 10/04/2024 9  % Final    % Eosinophils 10/04/2024 2  % Final    % Basophils 10/04/2024 1  % Final    % Immature Granulocytes 10/04/2024 0  % Final    Absolute Neutrophils 10/04/2024 3.0  1.6 - 8.3 10e3/uL Final    Absolute Lymphocytes 10/04/2024 1.4  0.8 - 5.3 10e3/uL Final    Absolute Monocytes 10/04/2024 0.4  0.0 - 1.3 10e3/uL Final    Absolute Eosinophils 10/04/2024 0.1  0.0 - 0.7 10e3/uL Final    Absolute Basophils 10/04/2024 0.1  0.0 - 0.2 10e3/uL Final    Absolute Immature Granulocytes 10/04/2024 0.0  <=0.4 10e3/uL Final    Total Protein Serum for ELP 10/04/2024 7.0  6.4 - 8.3 g/dL Final    Albumin 10/04/2024 4.3  3.7 - 5.1 g/dL Final    Alpha 1 10/04/2024 0.3  0.2 - 0.4 g/dL Final    Alpha 2 10/04/2024 0.7  0.5 - 0.9 g/dL Final    Beta Globulin 10/04/2024 0.8  0.6 - 1.0 g/dL Final    Gamma Globulin 10/04/2024 0.9  0.7 - 1.6 g/dL Final    Monoclonal Peak 10/04/2024 0.2 (H)  <=0.0 g/dL Final    ELP Interpretation 10/04/2024    Final                    Value:Small monoclonal protein (0.2 g/dL) seen in the gamma fraction, not previously characterized in our laboratory. Recommend serum and urine immunofixation for confirmation and further characterization if not previously performed elsewhere. Pathologic significance requires clinical correlation. Abhishek Macias M.D.,  Ph.D., Pathologist.    Signout Location if Remote 10/04/2024 BTH1   Final         Video-Visit Details    Type of service:  Video Visit   Video End Time: 9:46 AM  Originating Location (pt. Location): Home    Distant Location (provider location):  On-site  Platform used for Video Visit: Christ  Signed Electronically by: Luz Wells MD

## 2025-03-12 ENCOUNTER — OFFICE VISIT (OUTPATIENT)
Dept: OPTOMETRY | Facility: CLINIC | Age: 75
End: 2025-03-12
Payer: COMMERCIAL

## 2025-03-12 DIAGNOSIS — H26.491 POSTERIOR CAPSULAR OPACIFICATION OF RIGHT EYE, NOT OBSCURING VISION: ICD-10-CM

## 2025-03-12 DIAGNOSIS — Z96.1 PSEUDOPHAKIA: ICD-10-CM

## 2025-03-12 DIAGNOSIS — H52.03 HYPEROPIA OF BOTH EYES WITH ASTIGMATISM: Primary | ICD-10-CM

## 2025-03-12 DIAGNOSIS — H25.812 MIXED TYPE AGE-RELATED CATARACT, LEFT EYE: ICD-10-CM

## 2025-03-12 DIAGNOSIS — H04.129 DRY EYE: ICD-10-CM

## 2025-03-12 DIAGNOSIS — H52.203 HYPEROPIA OF BOTH EYES WITH ASTIGMATISM: Primary | ICD-10-CM

## 2025-03-12 PROCEDURE — 92015 DETERMINE REFRACTIVE STATE: CPT | Performed by: OPTOMETRIST

## 2025-03-12 PROCEDURE — 92014 COMPRE OPH EXAM EST PT 1/>: CPT | Performed by: OPTOMETRIST

## 2025-03-12 ASSESSMENT — REFRACTION_MANIFEST
OS_ADD: +2.75
OD_SPHERE: -0.75
METHOD_AUTOREFRACTION: 1
OS_SPHERE: +0.50
OS_AXIS: 087
OS_CYLINDER: +0.50
OS_SPHERE: +0.50
OD_AXIS: 062
OD_ADD: +2.75
OD_AXIS: 068
OD_CYLINDER: +0.75
OS_CYLINDER: +0.50
OS_AXIS: 130
OD_CYLINDER: +0.75
OD_SPHERE: -0.25

## 2025-03-12 ASSESSMENT — VISUAL ACUITY
OD_CC: 20/30
OD_CC+: -2
OD_CC: 20/25
METHOD: SNELLEN - LINEAR
OS_CC: 20/40
OS_CC: 20/30
CORRECTION_TYPE: GLASSES

## 2025-03-12 ASSESSMENT — CUP TO DISC RATIO
OS_RATIO: 0.3
OD_RATIO: 0.25

## 2025-03-12 ASSESSMENT — CONF VISUAL FIELD
OS_INFERIOR_TEMPORAL_RESTRICTION: 0
OD_NORMAL: 1
METHOD: COUNTING FINGERS
OD_INFERIOR_TEMPORAL_RESTRICTION: 0
OS_SUPERIOR_TEMPORAL_RESTRICTION: 0
OS_SUPERIOR_NASAL_RESTRICTION: 0
OD_SUPERIOR_TEMPORAL_RESTRICTION: 0
OS_NORMAL: 1
OD_SUPERIOR_NASAL_RESTRICTION: 0
OS_INFERIOR_NASAL_RESTRICTION: 0
OD_INFERIOR_NASAL_RESTRICTION: 0

## 2025-03-12 ASSESSMENT — SLIT LAMP EXAM - LIDS
COMMENTS: NORMAL
COMMENTS: NORMAL

## 2025-03-12 ASSESSMENT — REFRACTION_WEARINGRX
OS_AXIS: 060
OD_ADD: +2.50
OS_ADD: +2.50
OD_CYLINDER: +0.75
OD_SPHERE: -0.50
SPECS_TYPE: PAL
OS_CYLINDER: +0.50
OD_AXIS: 078
OS_SPHERE: +1.00

## 2025-03-12 ASSESSMENT — TONOMETRY
IOP_METHOD: APPLANATION
OD_IOP_MMHG: 13
OS_IOP_MMHG: 13

## 2025-03-12 ASSESSMENT — EXTERNAL EXAM - LEFT EYE: OS_EXAM: NORMAL

## 2025-03-12 ASSESSMENT — EXTERNAL EXAM - RIGHT EYE: OD_EXAM: NORMAL

## 2025-03-12 NOTE — PATIENT INSTRUCTIONS
[facility] ( formerly [facility])  [address]  [facility] [address]  Phone:[phone]

## 2025-03-12 NOTE — PROGRESS NOTES
Chief Complaint   Patient presents with    Annual Eye Exam        Last Eye Exam: 03/2024  Dilated Previously: Yes, side effects of dilation explained today    What are you currently using to see?  glasses       Distance Vision Acuity: Satisfied with vision, R, doesn't like to drive at night    Near Vision Acuity: Satisfied with vision while reading and using computer with glasses    Eye Comfort: dry  Do you use eye drops? : Yes: Systane a few times daily       Natalie Morfin - Optometric Assistant       Fohx glaucoma   CE R 2021      Medical, surgical and family histories reviewed and updated 3/12/2025.       OBJECTIVE: See Ophthalmology exam    ASSESSMENT:    ICD-10-CM    1. Hyperopia of both eyes with astigmatism  H52.03 REFRACTION    H52.203       2. Pseudophakia - Right Eye  Z96.1 EYE EXAM (SIMPLE-NONBILLABLE)      3. Dry eye  H04.129       4. Mixed type age-related cataract, left eye  H25.812 EYE EXAM (SIMPLE-NONBILLABLE)     REFRACTION     Adult Eye  Referral      5. Posterior capsular opacification of right eye, not obscuring vision  H26.491       Visual acuity has fallen from 20/25 in 2022 to 20/40  Mild epiretinal membrane L    PLAN:   Hold prescription   Refer for CE left eye [facility] [place] location  Kandi Anderson OD

## 2025-03-12 NOTE — LETTER
3/12/2025      Dakota Camejo  [address] [place] [address]      Dear Colleague,    Thank you for referring your patient, Dakota Camejo, to the [facility]. Please see a copy of my visit note below.    Chief Complaint   Patient presents with     Annual Eye Exam        Last Eye Exam: 03/2024  Dilated Previously: Yes, side effects of dilation explained today    What are you currently using to see?  glasses       Distance Vision Acuity: Satisfied with vision, R, doesn't like to drive at night    Near Vision Acuity: Satisfied with vision while reading and using computer with glasses    Eye Comfort: dry  Do you use eye drops? : Yes: Systane a few times daily       Natalie Morfin - Optometric Assistant       Foemily glaucoma   CE R 2021      Medical, surgical and family histories reviewed and updated 3/12/2025.       OBJECTIVE: See Ophthalmology exam    ASSESSMENT:    ICD-10-CM    1. Hyperopia of both eyes with astigmatism  H52.03 REFRACTION    H52.203       2. Pseudophakia - Right Eye  Z96.1 EYE EXAM (SIMPLE-NONBILLABLE)      3. Dry eye  H04.129       4. Mixed type age-related cataract, left eye  H25.812 EYE EXAM (SIMPLE-NONBILLABLE)     REFRACTION     Adult Eye  Referral      5. Posterior capsular opacification of right eye, not obscuring vision  H26.491       Visual acuity has fallen from 20/25 in 2022 to 20/40  Mild epiretinal membrane L    PLAN:   Hold prescription   Refer for CE left eye TCE  Kandi Anderson OD     Again, thank you for allowing me to participate in the care of your patient.        Sincerely,        Kandi Anderson, OD    Electronically signed

## 2025-03-16 ENCOUNTER — HEALTH MAINTENANCE LETTER (OUTPATIENT)
Age: 75
End: 2025-03-16

## 2025-04-01 ENCOUNTER — OFFICE VISIT (OUTPATIENT)
Dept: FAMILY MEDICINE | Facility: CLINIC | Age: 75
End: 2025-04-01
Payer: COMMERCIAL

## 2025-04-01 VITALS
BODY MASS INDEX: 23.56 KG/M2 | SYSTOLIC BLOOD PRESSURE: 106 MMHG | TEMPERATURE: 97.9 F | OXYGEN SATURATION: 95 % | DIASTOLIC BLOOD PRESSURE: 72 MMHG | RESPIRATION RATE: 16 BRPM | WEIGHT: 119.2 LBS | HEART RATE: 66 BPM

## 2025-04-01 DIAGNOSIS — C50.911 MALIGNANT NEOPLASM OF RIGHT FEMALE BREAST, UNSPECIFIED ESTROGEN RECEPTOR STATUS, UNSPECIFIED SITE OF BREAST (H): ICD-10-CM

## 2025-04-01 DIAGNOSIS — Z01.818 PREOP GENERAL PHYSICAL EXAM: Primary | ICD-10-CM

## 2025-04-01 DIAGNOSIS — H26.9 CATARACT OF LEFT EYE, UNSPECIFIED CATARACT TYPE: ICD-10-CM

## 2025-04-01 DIAGNOSIS — D47.2 MONOCLONAL GAMMOPATHY: ICD-10-CM

## 2025-04-01 PROCEDURE — 3078F DIAST BP <80 MM HG: CPT | Performed by: INTERNAL MEDICINE

## 2025-04-01 PROCEDURE — 3074F SYST BP LT 130 MM HG: CPT | Performed by: INTERNAL MEDICINE

## 2025-04-01 PROCEDURE — 99214 OFFICE O/P EST MOD 30 MIN: CPT | Performed by: INTERNAL MEDICINE

## 2025-04-01 PROCEDURE — 1126F AMNT PAIN NOTED NONE PRSNT: CPT | Performed by: INTERNAL MEDICINE

## 2025-04-01 ASSESSMENT — PAIN SCALES - GENERAL: PAINLEVEL_OUTOF10: NO PAIN (0)

## 2025-04-01 NOTE — PATIENT INSTRUCTIONS
As discussed, will consider only pertinent work up needed at this time    ===============================================    How to Take Your Medication Before Surgery  Preoperative Medication Instructions   Antiplatelet or Anticoagulation Medication Instructions   - aspirin: Discontinue aspirin 7 days prior to procedure to reduce bleeding risk. It should be resumed postoperatively.     Additional Medication Instructions  Take all scheduled medications on the day of surgery EXCEPT for modifications listed below:   - Herbal medications and vitamins: DO NOT TAKE 14 days prior to surgery.       Patient Education   Preparing for Your Surgery  For Adults  Getting started  In most cases, a nurse will call to review your health history and instructions. They will give you an arrival time based on your scheduled surgery time. Please be ready to share:  Your doctor's clinic name and phone number  Your medical, surgical, and anesthesia history  A list of allergies and sensitivities  A list of medicines, including herbal treatments and over-the-counter drugs  Whether the patient has a legal guardian (ask how to send us the papers in advance)  Note: You may not receive a call if you were seen at our PAC (Preoperative Assessment Center).  Please tell us if you're pregnant--or if there's any chance you might be pregnant. Some surgeries may injure a fetus (unborn baby), so they require a pregnancy test. Surgeries that are safe for a fetus don't always need a test, and you can choose whether to have one.   Preparing for surgery  Within 10 to 30 days of surgery: Have a pre-op exam (sometimes called an H&P, or History and Physical). This can be done at a clinic or pre-operative center.  If you're having a , you may not need this exam. Talk to your care team.  At your pre-op exam, talk to your care team about all medicines you take. (This includes CBD oil and any drugs, such as THC, marijuana, and other forms of cannabis.) If  you need to stop any medicine before surgery, ask when to start taking it again.  This is for your safety. Many medicines and drugs can make you bleed too much during surgery. Some change how well surgery (anesthesia) drugs work.  Call your insurance company to let them know you're having surgery. (If you don't have insurance, call [phone].)  Call your clinic if there's any change in your health. This includes a scrape or scratch near the surgery site, or any signs of a cold (sore throat, runny nose, cough, rash, fever).  Eating and drinking guidelines  For your safety: Unless your surgeon tells you otherwise, follow the guidelines below.  Eat and drink as normal until 8 hours before you arrive for surgery. After that, no food or milk. You can spit out gum when you arrive.  Drink clear liquids until 2 hours before you arrive. These are liquids you can see through, like water, Gatorade, and Propel Water. They also include plain black coffee and tea (no cream or milk).  No alcohol for 24 hours before you arrive. The night before surgery, stop any drinks that contain THC.  If your care team tells you to take medicine on the morning of surgery, it's okay to take it with a sip of water. No other medicines or drugs are allowed (including CBD oil)--follow your care team's instructions.  If you have questions the day of surgery, call your hospital or surgery center.   Preventing infection  Shower or bathe the night before and the morning of surgery. Follow the instructions your clinic gave you. (If no instructions, use regular soap.)  Don't shave or clip hair near your surgery site. We'll remove the hair if needed.  Don't smoke or vape the morning of surgery. No chewing tobacco for 6 hours before you arrive. A nicotine patch is okay. You may spit out nicotine gum when you arrive.  For some surgeries, the surgeon will tell you to fully quit smoking and nicotine.  We will make every effort to keep you safe from  infection. We will:  Clean our hands often with soap and water (or an alcohol-based hand rub).  Clean the skin at your surgery site with a special soap that kills germs.  Give you a special gown to keep you warm. (Cold raises the risk of infection.)  Wear hair covers, masks, gowns, and gloves during surgery.  Give antibiotic medicine, if prescribed. Not all surgeries need this medicine.  What to bring on the day of surgery  Photo ID and insurance card  Copy of your health care directive, if you have one  Glasses and hearing aids (bring cases)  You can't wear contacts during surgery  Inhaler and eye drops, if you use them (tell us about these when you arrive)  CPAP machine or breathing device, if you use them  A few personal items, if spending the night  If you have . . .  A pacemaker, ICD (cardiac defibrillator), or other implant: Bring the ID card.  An implanted stimulator: Bring the remote control.  A legal guardian: Bring a copy of the certified (court-stamped) guardianship papers.  Please remove any jewelry, including body piercings. Leave jewelry and other valuables at home.  If you're going home the day of surgery  You must have a responsible adult drive you home. They should stay with you overnight as well.  If you don't have someone to stay with you, and you aren't safe to go home alone, we may keep you overnight. Insurance often won't pay for this.  After surgery  If it's hard to control your pain or you need more pain medicine, please call your surgeon's office.  Questions?   If you have any questions for your care team, list them here:   ____________________________________________________________________________________________________________________________________________________________________________________________________________________________________________________________  For informational purposes only. Not to replace the advice of your health care provider. Copyright   2003, 2019 [facility]. All rights reserved. Clinically reviewed by Philip Castro MD. [place]aline [id number] - REV 08/24.

## 2025-04-01 NOTE — PROGRESS NOTES
Preoperative Evaluation  [address] [address]  Phone: [phone]  Primary Provider: Andressa Khanna MD  Pre-op Performing Provider: Andressa Khanna MD  Apr 1, 2025             3/27/2025   Surgical Information   What procedure is being done? Cataract surgery   Facility or Hospital where procedure/surgery will be performed: [facility] center   Who is doing the procedure / surgery? Khurram Gan   Date of surgery / procedure: April 24   Time of surgery / procedure: 9:30   Where do you plan to recover after surgery? at home with family     Fax number for surgical facility: [phone]    Assessment & Plan     The proposed surgical procedure is considered LOW risk.      Assessment and Plan  1. Preop general physical exam (Primary)  2. Cataract of left eye, unspecified cataract type  Patient is here for preoperative clearance of cataract surgery, does have medical conditions of  Rt breast cancer s/p radiotherapy risk factors on Arimidex, monoclonal gammopathy following heme oncology, osteoporosis on Fosamax.  - CBC with platelets; Future    3. Malignant neoplasm of right female breast, unspecified estrogen receptor status, unspecified site of breast (H)  4. Monoclonal gammopathy  Chronic stable, following Oncology on the surveillance and current chemotherapy.          Please note that this note consists of symbols derived from keyboarding, dictation and/or voice recognition software. As a result, there may be errors in the script that have gone undetected. Please consider this when interpreting information found in this chart.    Patient Instructions   As discussed, will consider only pertinent work up needed at this time    ===============================================    How to Take Your Medication Before Surgery  Preoperative Medication Instructions  Antiplatelet or Anticoagulation Medication Instructions   - aspirin:  Discontinue aspirin 7 days prior to procedure to reduce bleeding risk. It should be resumed postoperatively.     Additional Medication Instructions  Take all scheduled medications on the day of surgery EXCEPT for modifications listed below:   - Herbal medications and vitamins: DO NOT TAKE 14 days prior to surgery.       Patient Education  Preparing for Your Surgery  For Adults  Getting started  In most cases, a nurse will call to review your health history and instructions. They will give you an arrival time based on your scheduled surgery time. Please be ready to share:  Your doctor's clinic name and phone number  Your medical, surgical, and anesthesia history  A list of allergies and sensitivities  A list of medicines, including herbal treatments and over-the-counter drugs  Whether the patient has a legal guardian (ask how to send us the papers in advance)  Note: You may not receive a call if you were seen at our PAC (Preoperative Assessment Center).  Please tell us if you're pregnant--or if there's any chance you might be pregnant. Some surgeries may injure a fetus (unborn baby), so they require a pregnancy test. Surgeries that are safe for a fetus don't always need a test, and you can choose whether to have one.   Preparing for surgery  Within 10 to 30 days of surgery: Have a pre-op exam (sometimes called an H&P, or History and Physical). This can be done at a clinic or pre-operative center.  If you're having a , you may not need this exam. Talk to your care team.  At your pre-op exam, talk to your care team about all medicines you take. (This includes CBD oil and any drugs, such as THC, marijuana, and other forms of cannabis.) If you need to stop any medicine before surgery, ask when to start taking it again.  This is for your safety. Many medicines and drugs can make you bleed too much during surgery. Some change how well surgery (anesthesia) drugs work.  Call your insurance company to let them know  you're having surgery. (If you don't have insurance, call [phone].)  Call your clinic if there's any change in your health. This includes a scrape or scratch near the surgery site, or any signs of a cold (sore throat, runny nose, cough, rash, fever).  Eating and drinking guidelines  For your safety: Unless your surgeon tells you otherwise, follow the guidelines below.  Eat and drink as normal until 8 hours before you arrive for surgery. After that, no food or milk. You can spit out gum when you arrive.  Drink clear liquids until 2 hours before you arrive. These are liquids you can see through, like water, Gatorade, and Propel Water. They also include plain black coffee and tea (no cream or milk).  No alcohol for 24 hours before you arrive. The night before surgery, stop any drinks that contain THC.  If your care team tells you to take medicine on the morning of surgery, it's okay to take it with a sip of water. No other medicines or drugs are allowed (including CBD oil)--follow your care team's instructions.  If you have questions the day of surgery, call your hospital or surgery center.   Preventing infection  Shower or bathe the night before and the morning of surgery. Follow the instructions your clinic gave you. (If no instructions, use regular soap.)  Don't shave or clip hair near your surgery site. We'll remove the hair if needed.  Don't smoke or vape the morning of surgery. No chewing tobacco for 6 hours before you arrive. A nicotine patch is okay. You may spit out nicotine gum when you arrive.  For some surgeries, the surgeon will tell you to fully quit smoking and nicotine.  We will make every effort to keep you safe from infection. We will:  Clean our hands often with soap and water (or an alcohol-based hand rub).  Clean the skin at your surgery site with a special soap that kills germs.  Give you a special gown to keep you warm. (Cold raises the risk of infection.)  Wear hair covers, masks, gowns,  and gloves during surgery.  Give antibiotic medicine, if prescribed. Not all surgeries need this medicine.  What to bring on the day of surgery  Photo ID and insurance card  Copy of your health care directive, if you have one  Glasses and hearing aids (bring cases)  You can't wear contacts during surgery  Inhaler and eye drops, if you use them (tell us about these when you arrive)  CPAP machine or breathing device, if you use them  A few personal items, if spending the night  If you have . . .  A pacemaker, ICD (cardiac defibrillator), or other implant: Bring the ID card.  An implanted stimulator: Bring the remote control.  A legal guardian: Bring a copy of the certified (court-stamped) guardianship papers.  Please remove any jewelry, including body piercings. Leave jewelry and other valuables at home.  If you're going home the day of surgery  You must have a responsible adult drive you home. They should stay with you overnight as well.  If you don't have someone to stay with you, and you aren't safe to go home alone, we may keep you overnight. Insurance often won't pay for this.  After surgery  If it's hard to control your pain or you need more pain medicine, please call your surgeon's office.  Questions?   If you have any questions for your care team, list them here:   ____________________________________________________________________________________________________________________________________________________________________________________________________________________________________________________________  For informational purposes only. Not to replace the advice of your health care provider. Copyright   2003, 2019 [facility]. All rights reserved. Clinically reviewed by Philip Castro MD. [place] [id number] - REV 08/24.     Return in about 6 months (around 9/26/2025), or if symptoms worsen or fail to improve, for Annual Wellness Exam.    Andressa Khanna MD  [facility]  [place]          - No identified additional risk factors other than previously addressed    Preoperative Medication Instructions  Antiplatelet or Anticoagulation Medication Instructions   - aspirin: Discontinue aspirin 7 days prior to procedure to reduce bleeding risk. It should be resumed postoperatively.     Additional Medication Instructions  Take all scheduled medications on the day of surgery EXCEPT for modifications listed below:   - Herbal medications and vitamins: DO NOT TAKE 14 days prior to surgery.    Recommendation  Approval given to proceed with proposed procedure, without further diagnostic evaluation.    Aden Duncan is a 75 year old, presenting for the following:  Pre-Op Exam          4/1/2025     2:39 PM   Additional Questions   Roomed by Анна SAL:       Last seen patient in September 2024 for annual physical, she is here for preoperative clearance.        3/27/2025   Pre-Op Questionnaire   Have you ever had a heart attack or stroke? No   Have you ever had surgery on your heart or blood vessels, such as a stent placement, a coronary artery bypass, or surgery on an artery in your head, neck, heart, or legs? No   Do you have chest pain with activity? No   Do you have a history of heart failure? No   Do you currently have a cold, bronchitis or symptoms of other infection? No   Do you have a cough, shortness of breath, or wheezing? No   Do you or anyone in your family have previous history of blood clots? No   Do you or does anyone in your family have a serious bleeding problem such as prolonged bleeding following surgeries or cuts? No   Have you ever had problems with anemia or been told to take iron pills? No   Have you had any abnormal blood loss such as black, tarry or bloody stools, or abnormal vaginal bleeding? No   Have you ever had a blood transfusion? (!) UNKNOWN   Are you willing to have a blood transfusion if it is medically needed before, during, or after your surgery? Yes    Have you or any of your relatives ever had problems with anesthesia? No   Do you have sleep apnea, excessive snoring or daytime drowsiness? No   Do you have any artifical heart valves or other implanted medical devices like a pacemaker, defibrillator, or continuous glucose monitor? No   Do you have artificial joints? No   Are you allergic to latex? No     Health Care Directive  Patient has a Health Care Directive on file      Preoperative Review of    reviewed - no record of controlled substances prescribed.      Status of Chronic Conditions:  See problem list for active medical problems.  Problems all longstanding and stable, except as noted/documented.  See ROS for pertinent symptoms related to these conditions.    Patient Active Problem List    Diagnosis Date Noted    History of basal cell carcinoma 03/19/2024     Priority: Medium    History of posttraumatic stress disorder (PTSD) 09/19/2023     Priority: Medium    Hypercholesteremia 09/22/2022     Priority: Medium    Malignant neoplasm of central portion of right breast in female, estrogen receptor positive (H) 12/15/2020     Priority: Medium    PTSD (post-traumatic stress disorder) 09/06/2019     Priority: Medium    Mitral regurgitation 08/22/2018     Priority: Medium     Formatting of this note might be different from the original.   Trivial to mild 2013      Osteoporosis 07/26/2017     Priority: Medium     Formatting of this note might be different from the original.   L -2.7 2005   FOSAMAX held 2010 after 5y therapy   2013 osteopenia with low FRAX   2018 -2.5% spine   Fosamax began again in sept 2020      Vitreous detachment 07/20/2016     Priority: Medium    Monoclonal gammopathy 08/22/2011     Priority: Medium     Formatting of this note might be different from the original.   Incidental finding when evaluating elevated Ca.    IgG kappa 0.2mg/dL 6/2010, elevated Ca   Neg upep, nl cbc, nl Cr        Past Medical History:   Diagnosis Date    Arthritis      Cancer (H)      Past Surgical History:   Procedure Laterality Date    APPENDECTOMY  2007    BIOPSY  2020    BREAST SURGERY  2020    Lumpectomy    CATARACT IOL, RT/LT Right     COLONOSCOPY  2016    GYN SURGERY  2014    Hysterectomy     Current Outpatient Medications   Medication Sig Dispense Refill    alendronate (FOSAMAX) 70 MG tablet Take 1 tablet (70 mg) by mouth every 7 days. 12 tablet 3    anastrozole (ARIMIDEX) 1 MG tablet Take 1 tablet (1 mg) by mouth daily. 90 tablet 3    Aspirin (BUFFERIN LOW DOSE PO) Take 81 mg by mouth      calcium carbonate 500 mg, elemental, (OSCAL 500) 1250 (500 Ca) MG TABS tablet Take 500 mg by mouth      Cholecalciferol 10 MCG (400 UNIT) CAPS       fish oil-omega-3 fatty acids 1000 MG capsule Take 1,000 mg by mouth      multivitamin w/minerals (MULTI-VITAMIN) tablet Take 1 tablet by mouth daily         Allergies   Allergen Reactions    Amoxicillin Swelling    Sulfa Antibiotics      rash    Amoxicillin-Pot Clavulanate Rash    Erythromycin Swelling     hives    Other Reaction(s): Swelling, lips/tongue        Social History     Tobacco Use    Smoking status: Former     Current packs/day: 0.00     Average packs/day: 0.5 packs/day for 8.0 years (4.0 ttl pk-yrs)     Types: Cigarettes     Start date: 1971     Quit date: 1975     Years since quittin.2    Smokeless tobacco: Never    Tobacco comments:     Started smoking at 21 yrs age and stopped 26 yrs age .      3-4 Cig / day .    Substance Use Topics    Alcohol use: Yes     Family History   Problem Relation Age of Onset    Glaucoma Other     Diabetes Maternal Grandmother     Cerebrovascular Disease Maternal Grandmother     Diabetes Brother     Macular Degeneration No family hx of      History   Drug Use Unknown             Review of Systems  Constitutional, HEENT, cardiovascular, pulmonary, GI, , musculoskeletal, neuro, skin, endocrine and psych systems are negative, except as otherwise noted.    Objective    /72    "Pulse 66   Temp 97.9  F (36.6  C)   Resp 16   Wt 54.1 kg (119 lb 3.2 oz)   SpO2 95%   BMI 23.56 kg/m     Estimated body mass index is 23.56 kg/m  as calculated from the following:    Height as of 9/19/24: 1.515 m (4' 11.65\").    Weight as of this encounter: 54.1 kg (119 lb 3.2 oz).  Physical Exam  GENERAL: alert and no distress  EYES: Eyes grossly normal to inspection, PERRL and conjunctivae and sclerae normal  HENT: ear canals and TM's normal, nose and mouth without ulcers or lesions  NECK: no adenopathy, no asymmetry, masses, or scars  RESP: lungs clear to auscultation - no rales, rhonchi or wheezes  CV: regular rate and rhythm, normal S1 S2, no S3 or S4, no murmur, click or rub, no peripheral edema  ABDOMEN: soft, nontender, no hepatosplenomegaly, no masses and bowel sounds normal  MS: no gross musculoskeletal defects noted, no edema  SKIN: no suspicious lesions or rashes  NEURO: Normal strength and tone, mentation intact and speech normal  PSYCH: mentation appears normal, affect normal/bright    Recent Labs   Lab Test 10/04/24  0944   HGB 12.5         POTASSIUM 4.1   CR 0.69        Diagnostics  Labs pending at this time.  Results will be reviewed when available.  No results found for this or any previous visit (from the past 720 hours).   No EKG required for low risk surgery (cataract, skin procedure, breast biopsy, etc).    Revised Cardiac Risk Index (RCRI)  The patient has the following serious cardiovascular risks for perioperative complications:   - No serious cardiac risks = 0 points     RCRI Interpretation: 0 points: Class I (very low risk - 0.4% complication rate)         Signed Electronically by: Andressa Khanna MD  A copy of this evaluation report is provided to the requesting physician.         "

## 2025-06-25 ENCOUNTER — LAB (OUTPATIENT)
Dept: LAB | Facility: CLINIC | Age: 75
End: 2025-06-25
Payer: COMMERCIAL

## 2025-06-25 DIAGNOSIS — C50.911 MALIGNANT NEOPLASM OF RIGHT FEMALE BREAST, UNSPECIFIED ESTROGEN RECEPTOR STATUS, UNSPECIFIED SITE OF BREAST (H): ICD-10-CM

## 2025-06-25 DIAGNOSIS — Z79.811 LONG TERM (CURRENT) USE OF AROMATASE INHIBITORS: ICD-10-CM

## 2025-06-25 LAB
BASOPHILS # BLD AUTO: 0.1 10E3/UL (ref 0–0.2)
BASOPHILS NFR BLD AUTO: 1 %
EOSINOPHIL # BLD AUTO: 0.1 10E3/UL (ref 0–0.7)
EOSINOPHIL NFR BLD AUTO: 2 %
ERYTHROCYTE [DISTWIDTH] IN BLOOD BY AUTOMATED COUNT: 13 % (ref 10–15)
HCT VFR BLD AUTO: 36.5 % (ref 35–47)
HGB BLD-MCNC: 12.7 G/DL (ref 11.7–15.7)
IMM GRANULOCYTES # BLD: 0 10E3/UL
IMM GRANULOCYTES NFR BLD: 0 %
LYMPHOCYTES # BLD AUTO: 2.1 10E3/UL (ref 0.8–5.3)
LYMPHOCYTES NFR BLD AUTO: 35 %
MCH RBC QN AUTO: 32.1 PG (ref 26.5–33)
MCHC RBC AUTO-ENTMCNC: 34.8 G/DL (ref 31.5–36.5)
MCV RBC AUTO: 92 FL (ref 78–100)
MONOCYTES # BLD AUTO: 0.4 10E3/UL (ref 0–1.3)
MONOCYTES NFR BLD AUTO: 7 %
NEUTROPHILS # BLD AUTO: 3.4 10E3/UL (ref 1.6–8.3)
NEUTROPHILS NFR BLD AUTO: 56 %
PLATELET # BLD AUTO: 243 10E3/UL (ref 150–450)
RBC # BLD AUTO: 3.96 10E6/UL (ref 3.8–5.2)
WBC # BLD AUTO: 6 10E3/UL (ref 4–11)

## 2025-06-25 PROCEDURE — 80053 COMPREHEN METABOLIC PANEL: CPT

## 2025-06-25 PROCEDURE — 82378 CARCINOEMBRYONIC ANTIGEN: CPT

## 2025-06-25 PROCEDURE — 85025 COMPLETE CBC W/AUTO DIFF WBC: CPT

## 2025-06-25 PROCEDURE — 36415 COLL VENOUS BLD VENIPUNCTURE: CPT

## 2025-06-25 PROCEDURE — 86300 IMMUNOASSAY TUMOR CA 15-3: CPT

## 2025-06-26 LAB
ALBUMIN SERPL BCG-MCNC: 4.2 G/DL (ref 3.5–5.2)
ALP SERPL-CCNC: 84 U/L (ref 40–150)
ALT SERPL W P-5'-P-CCNC: 15 U/L (ref 0–50)
ANION GAP SERPL CALCULATED.3IONS-SCNC: 11 MMOL/L (ref 7–15)
AST SERPL W P-5'-P-CCNC: 22 U/L (ref 0–45)
BILIRUB SERPL-MCNC: 0.4 MG/DL
BUN SERPL-MCNC: 17.2 MG/DL (ref 8–23)
CALCIUM SERPL-MCNC: 10 MG/DL (ref 8.8–10.4)
CANCER AG15-3 SERPL-ACNC: 14 U/ML
CEA SERPL-MCNC: 1.9 NG/ML
CHLORIDE SERPL-SCNC: 101 MMOL/L (ref 98–107)
CREAT SERPL-MCNC: 0.73 MG/DL (ref 0.51–0.95)
EGFRCR SERPLBLD CKD-EPI 2021: 86 ML/MIN/1.73M2
GLUCOSE SERPL-MCNC: 84 MG/DL (ref 70–99)
HCO3 SERPL-SCNC: 26 MMOL/L (ref 22–29)
POTASSIUM SERPL-SCNC: 3.9 MMOL/L (ref 3.4–5.3)
PROT SERPL-MCNC: 6.8 G/DL (ref 6.4–8.3)
SODIUM SERPL-SCNC: 138 MMOL/L (ref 135–145)

## 2025-07-02 ENCOUNTER — ONCOLOGY VISIT (OUTPATIENT)
Dept: ONCOLOGY | Facility: CLINIC | Age: 75
End: 2025-07-02
Attending: INTERNAL MEDICINE
Payer: COMMERCIAL

## 2025-07-02 VITALS
WEIGHT: 116.2 LBS | HEART RATE: 68 BPM | BODY MASS INDEX: 22.96 KG/M2 | RESPIRATION RATE: 14 BRPM | OXYGEN SATURATION: 96 % | DIASTOLIC BLOOD PRESSURE: 64 MMHG | SYSTOLIC BLOOD PRESSURE: 107 MMHG | TEMPERATURE: 98.1 F

## 2025-07-02 DIAGNOSIS — Z12.31 ENCOUNTER FOR SCREENING MAMMOGRAM FOR MALIGNANT NEOPLASM OF BREAST: ICD-10-CM

## 2025-07-02 DIAGNOSIS — C50.911 MALIGNANT NEOPLASM OF RIGHT FEMALE BREAST, UNSPECIFIED ESTROGEN RECEPTOR STATUS, UNSPECIFIED SITE OF BREAST (H): Primary | ICD-10-CM

## 2025-07-02 DIAGNOSIS — Z79.811 LONG TERM (CURRENT) USE OF AROMATASE INHIBITORS: ICD-10-CM

## 2025-07-02 DIAGNOSIS — D47.2 MGUS (MONOCLONAL GAMMOPATHY OF UNKNOWN SIGNIFICANCE): ICD-10-CM

## 2025-07-02 PROCEDURE — G0463 HOSPITAL OUTPT CLINIC VISIT: HCPCS | Performed by: INTERNAL MEDICINE

## 2025-07-02 ASSESSMENT — PAIN SCALES - GENERAL: PAINLEVEL_OUTOF10: NO PAIN (0)

## 2025-07-02 NOTE — PROGRESS NOTES
[facility]    Hematology/Oncology return patient note      Today's Date: July 2, 2025    Reason for Consult: History of breast cancer        HISTORY OF PRESENT ILLNESS: Dakota is a very pleasant 74-year-old female with the following oncologic history  1.  History of stage I invasive ductal carcinoma of the right breast status postlumpectomy with sentinel node biopsy T1b N0 M0, grade 1, ER +90% [place] 30% HER2/jacqui FISH nonamplified.  Ki-67 10%.  Oncotype DX score of 20.  Diagnosed in October 2020.    2.  Status post radiation. Started adjuvant Arimidex in December 2020  3.  History of osteoporosis started Fosamax in 2020 January, declined Zometa  4.  History of basal cell carcinoma  5.  History of IgG kappa MGUS diagnosed in November 2022 being followed yearly  6 dexa 2024 feb t score -2.3  7 sp hysterectomy in 2013    Interval history: Dakota returns for follow-up today.   Overall she is doing great. No new complaints had a good celebration with her  7 year old grand daughter Donna.    REVIEW OF SYSTEMS:   14 point ROS was reviewed and is negative other than as noted above in HPI.       HOME MEDICATIONS:  Current Outpatient Medications   Medication Sig Dispense Refill    alendronate (FOSAMAX) 70 MG tablet Take 1 tablet (70 mg) by mouth every 7 days. 12 tablet 3    anastrozole (ARIMIDEX) 1 MG tablet Take 1 tablet (1 mg) by mouth daily. 90 tablet 3    Aspirin (BUFFERIN LOW DOSE PO) Take 81 mg by mouth      calcium carbonate 500 mg, elemental, (OSCAL 500) 1250 (500 Ca) MG TABS tablet Take 500 mg by mouth      Cholecalciferol 10 MCG (400 UNIT) CAPS       fish oil-omega-3 fatty acids 1000 MG capsule Take 1,000 mg by mouth      multivitamin w/minerals (MULTI-VITAMIN) tablet Take 1 tablet by mouth daily           ALLERGIES:  Allergies   Allergen Reactions    Amoxicillin Swelling    Sulfa Antibiotics      rash    Amoxicillin-Pot Clavulanate Rash    Erythromycin Swelling     hives    Other Reaction(s):  Swelling, lips/tongue         PAST MEDICAL HISTORY:  Noted and reviewed    PAST SURGICAL HISTORY:  Noted and reviewed      SOCIAL HISTORY:  Social History     Socioeconomic History    Marital status:      Spouse name: Not on file    Number of children: Not on file    Years of education: Not on file    Highest education level: Not on file   Occupational History    Not on file   Tobacco Use    Smoking status: Former     Current packs/day: 0.00     Average packs/day: 0.5 packs/day for 8.0 years (4.0 ttl pk-yrs)     Types: Cigarettes     Start date: 1971     Quit date: 1975     Years since quittin.5    Smokeless tobacco: Never    Tobacco comments:     Started smoking at 21 yrs age and stopped 26 yrs age .      3-4 Cig / day .    Vaping Use    Vaping status: Never Used   Substance and Sexual Activity    Alcohol use: Yes    Drug use: Never    Sexual activity: Not Currently     Partners: Male     Birth control/protection: Post-menopausal   Other Topics Concern    Parent/sibling w/ CABG, MI or angioplasty before 65F 55M? No   Social History Narrative    Not on file     Social Drivers of Health     Financial Resource Strain: Low Risk  (2024)    Financial Resource Strain     Within the past 12 months, have you or your family members you live with been unable to get utilities (heat, electricity) when it was really needed?: No   Food Insecurity: Low Risk  (2024)    Food Insecurity     Within the past 12 months, did you worry that your food would run out before you got money to buy more?: No     Within the past 12 months, did the food you bought just not last and you didn t have money to get more?: No   Transportation Needs: Low Risk  (2024)    Transportation Needs     Within the past 12 months, has lack of transportation kept you from medical appointments, getting your medicines, non-medical meetings or appointments, work, or from getting things that you need?: No   Physical Activity:  Sufficiently Active (2024)    Exercise Vital Sign     Days of Exercise per Week: 7 days     Minutes of Exercise per Session: 60 min   Stress: No Stress Concern Present (2024)    [employer] [employer] - Occupational Stress Questionnaire     Feeling of Stress : Not at all   Social Connections: Unknown (2024)    Social Connection and Isolation Panel [NHANES]     Frequency of Communication with Friends and Family: Not on file     Frequency of Social Gatherings with Friends and Family: Three times a week     Attends Evangelical Services: Not on file     Active Member of Clubs or Organizations: Not on file     Attends Club or Organization Meetings: Not on file     Marital Status: Not on file   Interpersonal Safety: Low Risk  (2025)    Interpersonal Safety     Do you feel physically and emotionally safe where you currently live?: Yes     Within the past 12 months, have you been hit, slapped, kicked or otherwise physically hurt by someone?: No     Within the past 12 months, have you been humiliated or emotionally abused in other ways by your partner or ex-partner?: No   Housing Stability: Low Risk  (2024)    Housing Stability     Do you have housing? : Yes     Are you worried about losing your housing?: No         FAMILY HISTORY:  Family History   Problem Relation Age of Onset    Glaucoma Other     Diabetes Maternal Grandmother     Cerebrovascular Disease Maternal Grandmother     Diabetes Brother     Macular Degeneration No family hx of          PHYSICAL EXAM:  Vital signs:  /64   Pulse 68   Temp 98.1  F (36.7  C) (Oral)   Resp 14   Wt 52.7 kg (116 lb 3.2 oz)   SpO2 96%   BMI 22.96 kg/m     ECO     Bilateral breast exams normal no evidence of recurrence no adenopathy noted    LABS:  Noted and stable . Reviewed with the patient     PATHOLOGY:  Noted as per HPI    IMAGING:  Noted as  per HPI    ASSESSMENT/PLAN:  Dakota is a very pleasant  75 year old female with history  of breast cancer and MGUS  Oncotype dx score of 20    I discussed with the patient that my recommendation would be to proceed with tamoxifen after doing 5 years of anastrozole therapy as she will not prefer to do further bisphosphonates.  I would like for her to complete 7 years of endocrine therapy.  She does not have a uterus in place therefore does not need follow-up for endometrial screening on tamoxifen.    1 Breast cancer  On arimidex   Change to tamoxifen in Jan, finish anastrazole in December      2 bone health  on fosamax since 2020 reviewed and looks improved . Should stop fosamax in 2025 December.   Declined zometa   Update dexa looks better follow will follow up in 2 years  Tamoxifen should be good for her bones and bone health    3 MGUS labs in 6 months  4 vitamin D test in 6 months     6 month follow up with labs then md visit     Total time spent on day of visit, including review of tests, obtaining/reviewing separately obtained history, ordering medications/tests/procedures, communicating with PCP/consultants, and documenting in electronic medical record: 40  minutes       Richie Harvey MD  Hematology/Oncology  [facility]

## 2025-07-02 NOTE — PROGRESS NOTES
"Oncology Rooming Note    July 2, 2025 12:49 PM   Dakota Camejo is a 75 year old female who presents for:    Chief Complaint   Patient presents with    Oncology Clinic Visit     Initial Vitals: /64   Pulse 68   Temp 98.1  F (36.7  C) (Oral)   Resp 14   Wt 52.7 kg (116 lb 3.2 oz)   SpO2 96%   BMI 22.96 kg/m   Estimated body mass index is 22.96 kg/m  as calculated from the following:    Height as of 9/19/24: 1.515 m (4' 11.65\").    Weight as of this encounter: 52.7 kg (116 lb 3.2 oz). Body surface area is 1.49 meters squared.  No Pain (0) Comment: Data Unavailable   No LMP recorded. Patient has had a hysterectomy.  Allergies reviewed: Yes  Medications reviewed: Yes    Medications: Medication refills not needed today.  Pharmacy name entered into [employer]: [id number] - [place], [place] - [address] AT [facility] [address]    Frailty Screening:   Is the patient here for a new oncology consult visit in cancer care? 2. No    PHQ9:  Did this patient require a PHQ9?: No      Clinical concerns: Hgb is too low to give blood Dr. Harvey  was notified.      Shari J. Schoenberger, [facility]            "

## 2025-07-02 NOTE — LETTER
"7/2/2025      Dakota Camejo  [address]  [place] [address]      Dear Colleague,    Thank you for referring your patient, Dakota Camejo, to the [facility]. Please see a copy of my visit note below.    Oncology Rooming Note    July 2, 2025 12:49 PM   Dakota Camejo is a 75 year old female who presents for:    Chief Complaint   Patient presents with     Oncology Clinic Visit     Initial Vitals: /64   Pulse 68   Temp 98.1  F (36.7  C) (Oral)   Resp 14   Wt 52.7 kg (116 lb 3.2 oz)   SpO2 96%   BMI 22.96 kg/m   Estimated body mass index is 22.96 kg/m  as calculated from the following:    Height as of 9/19/24: 1.515 m (4' 11.65\").    Weight as of this encounter: 52.7 kg (116 lb 3.2 oz). Body surface area is 1.49 meters squared.  No Pain (0) Comment: Data Unavailable   No LMP recorded. Patient has had a hysterectomy.  Allergies reviewed: Yes  Medications reviewed: Yes    Medications: Medication refills not needed today.  Pharmacy name entered into [employer]: [id number] [place] [place] [place] - [address] AT [facility] [address]    Frailty Screening:   Is the patient here for a new oncology consult visit in cancer care? 2. No    PHQ9:  Did this patient require a PHQ9?: No      Clinical concerns: Hgb is too low to give blood Dr. Harvey  was notified.      Shari J. Schoenberger, [facility]    Hematology/Oncology return patient note      Today's Date: July 2, 2025    Reason for Consult: History of breast cancer        HISTORY OF PRESENT ILLNESS: Dakota is a very pleasant 74-year-old female with the following oncologic history  1.  History of stage I invasive ductal carcinoma of the right breast status postlumpectomy with sentinel node biopsy T1b N0 M0, grade 1, ER +90% [place] 30% HER2/jacqui FISH nonamplified.  Ki-67 10%.  Oncotype DX score of 20.  Diagnosed in October 2020.    2.  Status post radiation. Started adjuvant " Arimidex in December 2020  3.  History of osteoporosis started Fosamax in 2020 January, declined Zometa  4.  History of basal cell carcinoma  5.  History of IgG kappa MGUS diagnosed in November 2022 being followed yearly  6 dexa 2024 feb t score -2.3  7 sp hysterectomy in 2013    Interval history: Dakota returns for follow-up today.   Overall she is doing great. No new complaints had a good celebration with her  7 year old grand daughter Donna.    REVIEW OF SYSTEMS:   14 point ROS was reviewed and is negative other than as noted above in HPI.       HOME MEDICATIONS:  Current Outpatient Medications   Medication Sig Dispense Refill     alendronate (FOSAMAX) 70 MG tablet Take 1 tablet (70 mg) by mouth every 7 days. 12 tablet 3     anastrozole (ARIMIDEX) 1 MG tablet Take 1 tablet (1 mg) by mouth daily. 90 tablet 3     Aspirin (BUFFERIN LOW DOSE PO) Take 81 mg by mouth       calcium carbonate 500 mg, elemental, (OSCAL 500) 1250 (500 Ca) MG TABS tablet Take 500 mg by mouth       Cholecalciferol 10 MCG (400 UNIT) CAPS        fish oil-omega-3 fatty acids 1000 MG capsule Take 1,000 mg by mouth       multivitamin w/minerals (MULTI-VITAMIN) tablet Take 1 tablet by mouth daily           ALLERGIES:  Allergies   Allergen Reactions     Amoxicillin Swelling     Sulfa Antibiotics      rash     Amoxicillin-Pot Clavulanate Rash     Erythromycin Swelling     hives    Other Reaction(s): Swelling, lips/tongue         PAST MEDICAL HISTORY:  Noted and reviewed    PAST SURGICAL HISTORY:  Noted and reviewed      SOCIAL HISTORY:  Social History     Socioeconomic History     Marital status:      Spouse name: Not on file     Number of children: Not on file     Years of education: Not on file     Highest education level: Not on file   Occupational History     Not on file   Tobacco Use     Smoking status: Former     Current packs/day: 0.00     Average packs/day: 0.5 packs/day for 8.0 years (4.0 ttl pk-yrs)     Types: Cigarettes     Start  date: 1971     Quit date: 1975     Years since quittin.5     Smokeless tobacco: Never     Tobacco comments:     Started smoking at 21 yrs age and stopped 26 yrs age .      3-4 Cig / day .    Vaping Use     Vaping status: Never Used   Substance and Sexual Activity     Alcohol use: Yes     Drug use: Never     Sexual activity: Not Currently     Partners: Male     Birth control/protection: Post-menopausal   Other Topics Concern     Parent/sibling w/ CABG, MI or angioplasty before 65F 55M? No   Social History Narrative     Not on file     Social Drivers of Health     Financial Resource Strain: Low Risk  (2024)    Financial Resource Strain      Within the past 12 months, have you or your family members you live with been unable to get utilities (heat, electricity) when it was really needed?: No   Food Insecurity: Low Risk  (2024)    Food Insecurity      Within the past 12 months, did you worry that your food would run out before you got money to buy more?: No      Within the past 12 months, did the food you bought just not last and you didn t have money to get more?: No   Transportation Needs: Low Risk  (2024)    Transportation Needs      Within the past 12 months, has lack of transportation kept you from medical appointments, getting your medicines, non-medical meetings or appointments, work, or from getting things that you need?: No   Physical Activity: Sufficiently Active (2024)    Exercise Vital Sign      Days of Exercise per Week: 7 days      Minutes of Exercise per Session: 60 min   Stress: No Stress Concern Present (2024)    [employer] [place] [employer] - Occupational Stress Questionnaire      Feeling of Stress : Not at all   Social Connections: Unknown (2024)    Social Connection and Isolation Panel [NHANES]      Frequency of Communication with Friends and Family: Not on file      Frequency of Social Gatherings with Friends and Family: Three times a week       Attends Taoist Services: Not on file      Active Member of Clubs or Organizations: Not on file      Attends Club or Organization Meetings: Not on file      Marital Status: Not on file   Interpersonal Safety: Low Risk  (2025)    Interpersonal Safety      Do you feel physically and emotionally safe where you currently live?: Yes      Within the past 12 months, have you been hit, slapped, kicked or otherwise physically hurt by someone?: No      Within the past 12 months, have you been humiliated or emotionally abused in other ways by your partner or ex-partner?: No   Housing Stability: Low Risk  (2024)    Housing Stability      Do you have housing? : Yes      Are you worried about losing your housing?: No         FAMILY HISTORY:  Family History   Problem Relation Age of Onset     Glaucoma Other      Diabetes Maternal Grandmother      Cerebrovascular Disease Maternal Grandmother      Diabetes Brother      Macular Degeneration No family hx of          PHYSICAL EXAM:  Vital signs:  /64   Pulse 68   Temp 98.1  F (36.7  C) (Oral)   Resp 14   Wt 52.7 kg (116 lb 3.2 oz)   SpO2 96%   BMI 22.96 kg/m     ECO     Bilateral breast exams normal no evidence of recurrence no adenopathy noted    LABS:  Noted and stable . Reviewed with the patient     PATHOLOGY:  Noted as per HPI    IMAGING:  Noted as  per HPI    ASSESSMENT/PLAN:  Dakota is a very pleasant  75 year old female with history of breast cancer and MGUS  Oncotype dx score of 20    I discussed with the patient that my recommendation would be to proceed with tamoxifen after doing 5 years of anastrozole therapy as she will not prefer to do further bisphosphonates.  I would like for her to complete 7 years of endocrine therapy.  She does not have a uterus in place therefore does not need follow-up for endometrial screening on tamoxifen.    1 Breast cancer  On arimidex   Change to tamoxifen in , finish anastrazole in December      2 bone health  on  fosamax since 2020 reviewed and looks improved . Should stop fosamax in 2025 December.   Declined zometa   Update dexa looks better follow will follow up in 2 years  Tamoxifen should be good for her bones and bone health    3 MGUS labs in 6 months  4 vitamin D test in 6 months     6 month follow up with labs then md visit     Total time spent on day of visit, including review of tests, obtaining/reviewing separately obtained history, ordering medications/tests/procedures, communicating with PCP/consultants, and documenting in electronic medical record: 40  minutes       Richie Harvey MD  Hematology/Oncology  [facility]     Again, thank you for allowing me to participate in the care of your patient.        Sincerely,        Richie Harvey MD    Electronically signed